# Patient Record
Sex: MALE | Race: BLACK OR AFRICAN AMERICAN | NOT HISPANIC OR LATINO
[De-identification: names, ages, dates, MRNs, and addresses within clinical notes are randomized per-mention and may not be internally consistent; named-entity substitution may affect disease eponyms.]

---

## 2017-02-07 ENCOUNTER — APPOINTMENT (OUTPATIENT)
Dept: OTHER | Facility: CLINIC | Age: 67
End: 2017-02-07

## 2017-02-07 VITALS
DIASTOLIC BLOOD PRESSURE: 86 MMHG | HEIGHT: 66 IN | OXYGEN SATURATION: 100 % | HEART RATE: 70 BPM | WEIGHT: 178 LBS | BODY MASS INDEX: 28.61 KG/M2 | SYSTOLIC BLOOD PRESSURE: 128 MMHG

## 2017-02-07 DIAGNOSIS — Z03.89 ENCOUNTER FOR OBSERVATION FOR OTHER SUSPECTED DISEASES AND CONDITIONS RULED OUT: ICD-10-CM

## 2017-02-07 RX ORDER — IBUPROFEN 600 MG/1
600 TABLET, FILM COATED ORAL
Qty: 20 | Refills: 0 | Status: DISCONTINUED | COMMUNITY
Start: 2016-10-28

## 2017-02-07 RX ORDER — HYDROCODONE BITARTRATE AND ACETAMINOPHEN 5; 300 MG/1; MG/1
5-300 TABLET ORAL
Qty: 20 | Refills: 0 | Status: DISCONTINUED | COMMUNITY
Start: 2016-12-03

## 2017-02-07 RX ORDER — CHLORHEXIDINE GLUCONATE, 0.12% ORAL RINSE 1.2 MG/ML
0.12 SOLUTION DENTAL
Qty: 473 | Refills: 0 | Status: DISCONTINUED | COMMUNITY
Start: 2016-12-03

## 2017-02-07 RX ORDER — METOPROLOL TARTRATE 25 MG/1
25 TABLET, FILM COATED ORAL
Qty: 60 | Refills: 0 | Status: DISCONTINUED | COMMUNITY
Start: 2016-10-13

## 2017-02-07 RX ORDER — SILDENAFIL CITRATE 100 MG/1
100 TABLET, FILM COATED ORAL
Qty: 6 | Refills: 0 | Status: DISCONTINUED | COMMUNITY
Start: 2016-12-06

## 2017-02-07 RX ORDER — AMOXICILLIN 250 MG/1
250 CAPSULE ORAL
Qty: 30 | Refills: 0 | Status: DISCONTINUED | COMMUNITY
Start: 2016-12-03

## 2017-02-07 RX ORDER — AMOXICILLIN AND CLAVULANATE POTASSIUM 875; 125 MG/1; MG/1
875-125 TABLET, COATED ORAL
Qty: 20 | Refills: 0 | Status: DISCONTINUED | COMMUNITY
Start: 2016-10-28

## 2017-02-08 LAB
ALBUMIN SERPL ELPH-MCNC: 4.4 G/DL
ALP BLD-CCNC: 54 U/L
ALT SERPL-CCNC: 28 U/L
ANION GAP SERPL CALC-SCNC: 15 MMOL/L
APPEARANCE: CLEAR
AST SERPL-CCNC: 26 U/L
BASOPHILS # BLD AUTO: 0.03 K/UL
BASOPHILS NFR BLD AUTO: 0.9 %
BILIRUB SERPL-MCNC: 0.4 MG/DL
BILIRUBIN URINE: NEGATIVE
BLOOD URINE: NEGATIVE
BUN SERPL-MCNC: 12 MG/DL
CALCIUM SERPL-MCNC: 9 MG/DL
CHLORIDE SERPL-SCNC: 104 MMOL/L
CHOLEST SERPL-MCNC: 212 MG/DL
CHOLEST/HDLC SERPL: 3.7 RATIO
CO2 SERPL-SCNC: 25 MMOL/L
COLOR: YELLOW
CREAT SERPL-MCNC: 1.05 MG/DL
EOSINOPHIL # BLD AUTO: 0.1 K/UL
EOSINOPHIL NFR BLD AUTO: 3 %
FERRITIN SERPL-MCNC: 110.5 NG/ML
GLUCOSE QUALITATIVE U: NORMAL MG/DL
GLUCOSE SERPL-MCNC: 111 MG/DL
HCT VFR BLD CALC: 36.1 %
HDLC SERPL-MCNC: 57 MG/DL
HGB BLD-MCNC: 12.2 G/DL
IMM GRANULOCYTES NFR BLD AUTO: 0.3 %
IRON SATN MFR SERPL: 22 %
IRON SERPL-MCNC: 75 UG/DL
KETONES URINE: NEGATIVE
LDLC SERPL CALC-MCNC: 133 MG/DL
LEUKOCYTE ESTERASE URINE: NEGATIVE
LYMPHOCYTES # BLD AUTO: 1.03 K/UL
LYMPHOCYTES NFR BLD AUTO: 30.7 %
MAN DIFF?: NORMAL
MCHC RBC-ENTMCNC: 27.4 PG
MCHC RBC-ENTMCNC: 33.8 GM/DL
MCV RBC AUTO: 80.9 FL
MONOCYTES # BLD AUTO: 0.2 K/UL
MONOCYTES NFR BLD AUTO: 6 %
NEUTROPHILS # BLD AUTO: 1.99 K/UL
NEUTROPHILS NFR BLD AUTO: 59.1 %
NITRITE URINE: NEGATIVE
PH URINE: 5.5
PLATELET # BLD AUTO: 152 K/UL
POTASSIUM SERPL-SCNC: 3.8 MMOL/L
PROT SERPL-MCNC: 6.9 G/DL
PROTEIN URINE: 30 MG/DL
RBC # BLD: 4.46 M/UL
RBC # FLD: 13.7 %
SODIUM SERPL-SCNC: 144 MMOL/L
SPECIFIC GRAVITY URINE: 1.02
TIBC SERPL-MCNC: 347 UG/DL
TRIGL SERPL-MCNC: 112 MG/DL
UIBC SERPL-MCNC: 272 UG/DL
UROBILINOGEN URINE: NORMAL MG/DL
WBC # FLD AUTO: 3.36 K/UL

## 2017-03-05 ENCOUNTER — INPATIENT (INPATIENT)
Facility: HOSPITAL | Age: 67
LOS: 1 days | Discharge: HOME | End: 2017-03-07
Attending: INTERNAL MEDICINE | Admitting: INTERNAL MEDICINE
Payer: COMMERCIAL

## 2017-03-05 VITALS
HEART RATE: 106 BPM | TEMPERATURE: 103 F | DIASTOLIC BLOOD PRESSURE: 96 MMHG | OXYGEN SATURATION: 99 % | RESPIRATION RATE: 20 BRPM | SYSTOLIC BLOOD PRESSURE: 159 MMHG

## 2017-03-05 DIAGNOSIS — J18.9 PNEUMONIA, UNSPECIFIED ORGANISM: ICD-10-CM

## 2017-03-05 DIAGNOSIS — Z41.8 ENCOUNTER FOR OTHER PROCEDURES FOR PURPOSES OTHER THAN REMEDYING HEALTH STATE: ICD-10-CM

## 2017-03-05 DIAGNOSIS — J18.1 LOBAR PNEUMONIA, UNSPECIFIED ORGANISM: ICD-10-CM

## 2017-03-05 DIAGNOSIS — N17.9 ACUTE KIDNEY FAILURE, UNSPECIFIED: ICD-10-CM

## 2017-03-05 LAB
ALBUMIN SERPL ELPH-MCNC: 4.2 G/DL — SIGNIFICANT CHANGE UP (ref 3.3–5)
ALP SERPL-CCNC: 55 U/L — SIGNIFICANT CHANGE UP (ref 40–120)
ALT FLD-CCNC: 40 U/L — SIGNIFICANT CHANGE UP (ref 4–41)
APPEARANCE UR: CLEAR — SIGNIFICANT CHANGE UP
AST SERPL-CCNC: 55 U/L — HIGH (ref 4–40)
B PERT DNA SPEC QL NAA+PROBE: SIGNIFICANT CHANGE UP
BASE EXCESS BLDV CALC-SCNC: 3.9 MMOL/L — SIGNIFICANT CHANGE UP
BASOPHILS # BLD AUTO: 0.01 K/UL — SIGNIFICANT CHANGE UP (ref 0–0.2)
BASOPHILS NFR BLD AUTO: 0.1 % — SIGNIFICANT CHANGE UP (ref 0–2)
BILIRUB SERPL-MCNC: 0.4 MG/DL — SIGNIFICANT CHANGE UP (ref 0.2–1.2)
BILIRUB UR-MCNC: NEGATIVE — SIGNIFICANT CHANGE UP
BLOOD GAS VENOUS - CREATININE: 1.46 MG/DL — HIGH (ref 0.5–1.3)
BLOOD UR QL VISUAL: HIGH
BUN SERPL-MCNC: 11 MG/DL — SIGNIFICANT CHANGE UP (ref 7–23)
C PNEUM DNA SPEC QL NAA+PROBE: NOT DETECTED — SIGNIFICANT CHANGE UP
CALCIUM SERPL-MCNC: 8.9 MG/DL — SIGNIFICANT CHANGE UP (ref 8.4–10.5)
CHLORIDE BLDV-SCNC: 99 MMOL/L — SIGNIFICANT CHANGE UP (ref 96–108)
CHLORIDE SERPL-SCNC: 95 MMOL/L — LOW (ref 98–107)
CK SERPL-CCNC: 1031 U/L — HIGH (ref 30–200)
CO2 SERPL-SCNC: 25 MMOL/L — SIGNIFICANT CHANGE UP (ref 22–31)
COLOR SPEC: YELLOW — SIGNIFICANT CHANGE UP
CREAT SERPL-MCNC: 1.5 MG/DL — HIGH (ref 0.5–1.3)
EOSINOPHIL # BLD AUTO: 0 K/UL — SIGNIFICANT CHANGE UP (ref 0–0.5)
EOSINOPHIL NFR BLD AUTO: 0 % — SIGNIFICANT CHANGE UP (ref 0–6)
FLUAV H1 2009 PAND RNA SPEC QL NAA+PROBE: NOT DETECTED — SIGNIFICANT CHANGE UP
FLUAV H1 RNA SPEC QL NAA+PROBE: NOT DETECTED — SIGNIFICANT CHANGE UP
FLUAV H3 RNA SPEC QL NAA+PROBE: NOT DETECTED — SIGNIFICANT CHANGE UP
FLUAV SUBTYP SPEC NAA+PROBE: SIGNIFICANT CHANGE UP
FLUBV RNA SPEC QL NAA+PROBE: NOT DETECTED — SIGNIFICANT CHANGE UP
GAS PNL BLDV: 132 MMOL/L — LOW (ref 136–146)
GLUCOSE BLDV-MCNC: 192 — HIGH (ref 70–99)
GLUCOSE SERPL-MCNC: 198 MG/DL — HIGH (ref 70–99)
GLUCOSE UR-MCNC: NEGATIVE — SIGNIFICANT CHANGE UP
HADV DNA SPEC QL NAA+PROBE: NOT DETECTED — SIGNIFICANT CHANGE UP
HCO3 BLDV-SCNC: 26 MMOL/L — SIGNIFICANT CHANGE UP (ref 20–27)
HCOV 229E RNA SPEC QL NAA+PROBE: NOT DETECTED — SIGNIFICANT CHANGE UP
HCOV HKU1 RNA SPEC QL NAA+PROBE: NOT DETECTED — SIGNIFICANT CHANGE UP
HCOV NL63 RNA SPEC QL NAA+PROBE: NOT DETECTED — SIGNIFICANT CHANGE UP
HCOV OC43 RNA SPEC QL NAA+PROBE: NOT DETECTED — SIGNIFICANT CHANGE UP
HCT VFR BLD CALC: 39.2 % — SIGNIFICANT CHANGE UP (ref 39–50)
HCT VFR BLDV CALC: 43.4 % — SIGNIFICANT CHANGE UP (ref 39–51)
HGB BLD-MCNC: 13.9 G/DL — SIGNIFICANT CHANGE UP (ref 13–17)
HGB BLDV-MCNC: 14.2 G/DL — SIGNIFICANT CHANGE UP (ref 13–17)
HMPV RNA SPEC QL NAA+PROBE: NOT DETECTED — SIGNIFICANT CHANGE UP
HPIV1 RNA SPEC QL NAA+PROBE: NOT DETECTED — SIGNIFICANT CHANGE UP
HPIV2 RNA SPEC QL NAA+PROBE: NOT DETECTED — SIGNIFICANT CHANGE UP
HPIV3 RNA SPEC QL NAA+PROBE: NOT DETECTED — SIGNIFICANT CHANGE UP
HPIV4 RNA SPEC QL NAA+PROBE: NOT DETECTED — SIGNIFICANT CHANGE UP
IMM GRANULOCYTES NFR BLD AUTO: 0.1 % — SIGNIFICANT CHANGE UP (ref 0–1.5)
KETONES UR-MCNC: NEGATIVE — SIGNIFICANT CHANGE UP
LACTATE BLDV-MCNC: 1.5 MMOL/L — SIGNIFICANT CHANGE UP (ref 0.5–2)
LEUKOCYTE ESTERASE UR-ACNC: NEGATIVE — SIGNIFICANT CHANGE UP
LYMPHOCYTES # BLD AUTO: 1.15 K/UL — SIGNIFICANT CHANGE UP (ref 1–3.3)
LYMPHOCYTES # BLD AUTO: 16.3 % — SIGNIFICANT CHANGE UP (ref 13–44)
M PNEUMO DNA SPEC QL NAA+PROBE: NOT DETECTED — SIGNIFICANT CHANGE UP
MCHC RBC-ENTMCNC: 28.1 PG — SIGNIFICANT CHANGE UP (ref 27–34)
MCHC RBC-ENTMCNC: 35.5 % — SIGNIFICANT CHANGE UP (ref 32–36)
MCV RBC AUTO: 79.2 FL — LOW (ref 80–100)
MONOCYTES # BLD AUTO: 0.58 K/UL — SIGNIFICANT CHANGE UP (ref 0–0.9)
MONOCYTES NFR BLD AUTO: 8.2 % — SIGNIFICANT CHANGE UP (ref 2–14)
MUCOUS THREADS # UR AUTO: SIGNIFICANT CHANGE UP
NEUTROPHILS # BLD AUTO: 5.31 K/UL — SIGNIFICANT CHANGE UP (ref 1.8–7.4)
NEUTROPHILS NFR BLD AUTO: 75.3 % — SIGNIFICANT CHANGE UP (ref 43–77)
NITRITE UR-MCNC: NEGATIVE — SIGNIFICANT CHANGE UP
PCO2 BLDV: 45 MMHG — SIGNIFICANT CHANGE UP (ref 41–51)
PH BLDV: 7.41 PH — SIGNIFICANT CHANGE UP (ref 7.32–7.43)
PH UR: 6 — SIGNIFICANT CHANGE UP (ref 4.6–8)
PLATELET # BLD AUTO: 118 K/UL — LOW (ref 150–400)
PMV BLD: 10.8 FL — SIGNIFICANT CHANGE UP (ref 7–13)
PO2 BLDV: 27 MMHG — LOW (ref 35–40)
POTASSIUM BLDV-SCNC: 4 MMOL/L — SIGNIFICANT CHANGE UP (ref 3.4–4.5)
POTASSIUM SERPL-MCNC: 4.3 MMOL/L — SIGNIFICANT CHANGE UP (ref 3.5–5.3)
POTASSIUM SERPL-SCNC: 4.3 MMOL/L — SIGNIFICANT CHANGE UP (ref 3.5–5.3)
PROT SERPL-MCNC: 8.2 G/DL — SIGNIFICANT CHANGE UP (ref 6–8.3)
PROT UR-MCNC: 150 — HIGH
RBC # BLD: 4.95 M/UL — SIGNIFICANT CHANGE UP (ref 4.2–5.8)
RBC # FLD: 13.6 % — SIGNIFICANT CHANGE UP (ref 10.3–14.5)
RBC CASTS # UR COMP ASSIST: SIGNIFICANT CHANGE UP (ref 0–?)
RSV RNA SPEC QL NAA+PROBE: NOT DETECTED — SIGNIFICANT CHANGE UP
RV+EV RNA SPEC QL NAA+PROBE: NOT DETECTED — SIGNIFICANT CHANGE UP
SAO2 % BLDV: 41.2 % — LOW (ref 60–85)
SODIUM SERPL-SCNC: 137 MMOL/L — SIGNIFICANT CHANGE UP (ref 135–145)
SP GR SPEC: 1.02 — SIGNIFICANT CHANGE UP (ref 1–1.03)
UROBILINOGEN FLD QL: NORMAL E.U. — SIGNIFICANT CHANGE UP (ref 0.1–0.2)
WBC # BLD: 7.06 K/UL — SIGNIFICANT CHANGE UP (ref 3.8–10.5)
WBC # FLD AUTO: 7.06 K/UL — SIGNIFICANT CHANGE UP (ref 3.8–10.5)
WBC UR QL: SIGNIFICANT CHANGE UP (ref 0–?)

## 2017-03-05 PROCEDURE — 99223 1ST HOSP IP/OBS HIGH 75: CPT | Mod: GC

## 2017-03-05 PROCEDURE — 71020: CPT | Mod: 26

## 2017-03-05 PROCEDURE — 93010 ELECTROCARDIOGRAM REPORT: CPT

## 2017-03-05 RX ORDER — SODIUM CHLORIDE 9 MG/ML
1000 INJECTION INTRAMUSCULAR; INTRAVENOUS; SUBCUTANEOUS
Qty: 0 | Refills: 0 | Status: DISCONTINUED | OUTPATIENT
Start: 2017-03-05 | End: 2017-03-07

## 2017-03-05 RX ORDER — HEPARIN SODIUM 5000 [USP'U]/ML
5000 INJECTION INTRAVENOUS; SUBCUTANEOUS EVERY 8 HOURS
Qty: 0 | Refills: 0 | Status: DISCONTINUED | OUTPATIENT
Start: 2017-03-05 | End: 2017-03-07

## 2017-03-05 RX ORDER — ACETAMINOPHEN 500 MG
975 TABLET ORAL ONCE
Qty: 0 | Refills: 0 | Status: COMPLETED | OUTPATIENT
Start: 2017-03-05 | End: 2017-03-05

## 2017-03-05 RX ORDER — ACETAMINOPHEN 500 MG
650 TABLET ORAL EVERY 6 HOURS
Qty: 0 | Refills: 0 | Status: DISCONTINUED | OUTPATIENT
Start: 2017-03-05 | End: 2017-03-05

## 2017-03-05 RX ORDER — ACETAMINOPHEN 500 MG
650 TABLET ORAL EVERY 6 HOURS
Qty: 0 | Refills: 0 | Status: DISCONTINUED | OUTPATIENT
Start: 2017-03-05 | End: 2017-03-07

## 2017-03-05 RX ORDER — SODIUM CHLORIDE 9 MG/ML
2000 INJECTION INTRAMUSCULAR; INTRAVENOUS; SUBCUTANEOUS ONCE
Qty: 0 | Refills: 0 | Status: COMPLETED | OUTPATIENT
Start: 2017-03-05 | End: 2017-03-05

## 2017-03-05 RX ORDER — LACTOBACILLUS ACIDOPHILUS 100MM CELL
1 CAPSULE ORAL
Qty: 0 | Refills: 0 | Status: DISCONTINUED | OUTPATIENT
Start: 2017-03-05 | End: 2017-03-07

## 2017-03-05 RX ORDER — AZITHROMYCIN 500 MG/1
500 TABLET, FILM COATED ORAL EVERY 24 HOURS
Qty: 0 | Refills: 0 | Status: DISCONTINUED | OUTPATIENT
Start: 2017-03-06 | End: 2017-03-06

## 2017-03-05 RX ORDER — AZITHROMYCIN 500 MG/1
500 TABLET, FILM COATED ORAL ONCE
Qty: 0 | Refills: 0 | Status: COMPLETED | OUTPATIENT
Start: 2017-03-05 | End: 2017-03-05

## 2017-03-05 RX ORDER — SODIUM CHLORIDE 9 MG/ML
1000 INJECTION INTRAMUSCULAR; INTRAVENOUS; SUBCUTANEOUS ONCE
Qty: 0 | Refills: 0 | Status: COMPLETED | OUTPATIENT
Start: 2017-03-05 | End: 2017-03-05

## 2017-03-05 RX ORDER — CEFTRIAXONE 500 MG/1
1 INJECTION, POWDER, FOR SOLUTION INTRAMUSCULAR; INTRAVENOUS EVERY 24 HOURS
Qty: 0 | Refills: 0 | Status: DISCONTINUED | OUTPATIENT
Start: 2017-03-06 | End: 2017-03-06

## 2017-03-05 RX ADMIN — AZITHROMYCIN 500 MILLIGRAM(S): 500 TABLET, FILM COATED ORAL at 18:51

## 2017-03-05 RX ADMIN — SODIUM CHLORIDE 75 MILLILITER(S): 9 INJECTION INTRAMUSCULAR; INTRAVENOUS; SUBCUTANEOUS at 22:10

## 2017-03-05 RX ADMIN — Medication 975 MILLIGRAM(S): at 18:41

## 2017-03-05 RX ADMIN — HEPARIN SODIUM 5000 UNIT(S): 5000 INJECTION INTRAVENOUS; SUBCUTANEOUS at 22:18

## 2017-03-05 RX ADMIN — Medication 1 TABLET(S): at 18:51

## 2017-03-05 RX ADMIN — SODIUM CHLORIDE 1000 MILLILITER(S): 9 INJECTION INTRAMUSCULAR; INTRAVENOUS; SUBCUTANEOUS at 20:49

## 2017-03-05 RX ADMIN — SODIUM CHLORIDE 2000 MILLILITER(S): 9 INJECTION INTRAMUSCULAR; INTRAVENOUS; SUBCUTANEOUS at 18:41

## 2017-03-05 NOTE — H&P ADULT. - MUSCULOSKELETAL
details… detailed exam decreased ROM due to pain/ROM intact/no joint swelling/no calf tenderness no calf tenderness/no joint swelling/ROM intact/no joint erythema/decreased ROM due to pain/no joint warmth

## 2017-03-05 NOTE — ED PROVIDER NOTE - OBJECTIVE STATEMENT
67 y/o M w/o Hx pw fever.  Pt 5 days of fever associated w/ generalized arthralgias/myalgias, started on tamilfu by PMD 4 days PTA, symptoms persisted, limited PO and loss of appetite so came to ED.  Denies CP, cough, sore throat, AP, n/v, d/c, SOB, known sick contacts, or recent travel.

## 2017-03-05 NOTE — ED PROVIDER NOTE - CARE PLAN
Principal Discharge DX:	Pneumonia of left lower lobe due to infectious organism  Secondary Diagnosis:	Non-traumatic rhabdomyolysis

## 2017-03-05 NOTE — PATIENT PROFILE ADULT. - NS PRO CONTRA REFUSE FLU INFO
Risks/benefits discussed with patient or patient surrogate Vaccine Information Sheet (VIS) provided-VIS date: 8/07/15/Risks/benefits discussed with patient or patient surrogate

## 2017-03-05 NOTE — H&P ADULT. - HISTORY OF PRESENT ILLNESS
65 y/o M with PMH of prostate CA s/p radiation, pw 5 dayds hx of fever, malaise, fatigue.  Sx started March 1st with  fever associated w/ generalized arthralgias/myalgias. He went to see his PCP Dr. Hinson on March 2nd who started him on Tamiflu Pt reports no improvement in symptoms the past few days. He also has limited PO intake and loss of appetite so came to ED.  Denies CP, cough, sore throat, AP, n/v, d/c, SOB, known sick contacts, or recent travel.  ED course: T 102.9, , /96, RR 20, SpO2 99% RA  Initial labs show CBC wnl, Cr 1.5, CK 1031, UA negative for infxn.  Pt was given  3L IV fluid, started on Augmentin, azithromycin admitted to medicine for further management. 67 y/o M with PMH of prostate CA s/p radiation, pw 5 dayds hx of fever, malaise, fatigue.  Sx started March 1st with  fever associated w/ generalized arthralgias/myalgias. He went to see his PCP Dr. Hinson on March 2nd who started him on Tamiflu. Pt reports no improvement in symptoms the past few days. He also has limited PO intake and loss of appetite so came to ED.  Denies CP, cough, sore throat, AP, n/v, d/c, SOB, known sick contacts, or recent travel.  ED course: T 102.9, , /96, RR 20, SpO2 99% RA  Initial labs show CBC wnl, Cr 1.5, CK 1031, UA negative for infxn, pending RVP.  Pt was given  3L IV fluid, started on Augmentin, azithromycin admitted to medicine for further management.

## 2017-03-05 NOTE — ED ADULT NURSE NOTE - OBJECTIVE STATEMENT
Patient presented to ED with c/o chills, malaise, and decreased appetite patient recently dx with the flu on day 4 of Tamiflu. Blood work drawn and sent to lab.  ER provider evaluated patient medication ordered and administered.  Family present at the bedside. Will continue to monitor patient closely.  Casey LOPEZ

## 2017-03-05 NOTE — H&P ADULT. - RS GEN PE MLT RESP DETAILS PC
respirations non-labored/no rhonchi/good air movement/no wheezes/airway patent/no chest wall tenderness/breath sounds equal

## 2017-03-05 NOTE — H&P ADULT. - PROBLEM SELECTOR PLAN 1
-pt with 5 day hx of fever, malaise, fatigue  -possible LLL pna on CXR   -will treat with ceftriaxone, azithromycin  -c/w IVF at 75 cc/hr -pt with 5 day hx of fever, malaise, fatigue  -possible LLL pna on CXR   -will treat with ceftriaxone, azithromycin  -f/u RVP  -c/w IVF at 75 cc/hr

## 2017-03-05 NOTE — H&P ADULT. - PROBLEM SELECTOR PLAN 2
-likely prerenal due to dehydration and mild rhabdomyolysis likely from prolonged immobility  2/p 3L IVF   -will continue IVF at 75 cc/hr  -avoid nephrotoxin  -continue to trend BMP

## 2017-03-05 NOTE — ED PROVIDER NOTE - ATTENDING CONTRIBUTION TO CARE
Pt was seen and evaluated by me. Pt states having body aches and fever starting 4 days. Pt was seen and started on Tamiful with no relief. Pt was taking Tylenol but felt it wasn't helping and notes still having body aches and fevers. Pt denies any headache, neck pain, back pain, cough, SOB, chest pain, or abd pain. Lungs coarse b/l. Tachy. Abd soft, non-tender.

## 2017-03-06 LAB
ALBUMIN SERPL ELPH-MCNC: 3.2 G/DL — LOW (ref 3.3–5)
ALBUMIN SERPL ELPH-MCNC: 3.2 G/DL — LOW (ref 3.3–5)
ALP SERPL-CCNC: 46 U/L — SIGNIFICANT CHANGE UP (ref 40–120)
ALP SERPL-CCNC: 46 U/L — SIGNIFICANT CHANGE UP (ref 40–120)
ALT FLD-CCNC: 39 U/L — SIGNIFICANT CHANGE UP (ref 4–41)
ALT FLD-CCNC: 39 U/L — SIGNIFICANT CHANGE UP (ref 4–41)
APTT BLD: 34.6 SEC — SIGNIFICANT CHANGE UP (ref 27.5–37.4)
AST SERPL-CCNC: 51 U/L — HIGH (ref 4–40)
AST SERPL-CCNC: 51 U/L — HIGH (ref 4–40)
BASOPHILS # BLD AUTO: 0.01 K/UL — SIGNIFICANT CHANGE UP (ref 0–0.2)
BASOPHILS NFR BLD AUTO: 0.2 % — SIGNIFICANT CHANGE UP (ref 0–2)
BILIRUB SERPL-MCNC: 0.4 MG/DL — SIGNIFICANT CHANGE UP (ref 0.2–1.2)
BILIRUB SERPL-MCNC: 0.4 MG/DL — SIGNIFICANT CHANGE UP (ref 0.2–1.2)
BUN SERPL-MCNC: 10 MG/DL — SIGNIFICANT CHANGE UP (ref 7–23)
BUN SERPL-MCNC: 10 MG/DL — SIGNIFICANT CHANGE UP (ref 7–23)
CALCIUM SERPL-MCNC: 7.9 MG/DL — LOW (ref 8.4–10.5)
CALCIUM SERPL-MCNC: 7.9 MG/DL — LOW (ref 8.4–10.5)
CHLORIDE SERPL-SCNC: 101 MMOL/L — SIGNIFICANT CHANGE UP (ref 98–107)
CHLORIDE SERPL-SCNC: 101 MMOL/L — SIGNIFICANT CHANGE UP (ref 98–107)
CHOLEST SERPL-MCNC: 162 MG/DL — SIGNIFICANT CHANGE UP (ref 120–199)
CK SERPL-CCNC: 1016 U/L — HIGH (ref 30–200)
CO2 SERPL-SCNC: 23 MMOL/L — SIGNIFICANT CHANGE UP (ref 22–31)
CO2 SERPL-SCNC: 23 MMOL/L — SIGNIFICANT CHANGE UP (ref 22–31)
CREAT SERPL-MCNC: 1.23 MG/DL — SIGNIFICANT CHANGE UP (ref 0.5–1.3)
CREAT SERPL-MCNC: 1.23 MG/DL — SIGNIFICANT CHANGE UP (ref 0.5–1.3)
EOSINOPHIL # BLD AUTO: 0.02 K/UL — SIGNIFICANT CHANGE UP (ref 0–0.5)
EOSINOPHIL NFR BLD AUTO: 0.4 % — SIGNIFICANT CHANGE UP (ref 0–6)
GLUCOSE SERPL-MCNC: 149 MG/DL — HIGH (ref 70–99)
GLUCOSE SERPL-MCNC: 149 MG/DL — HIGH (ref 70–99)
HAV IGG+IGM SER QL: NONREACTIVE — SIGNIFICANT CHANGE UP
HBA1C BLD-MCNC: 6.2 % — HIGH (ref 4–5.6)
HBV SURFACE AB SER-ACNC: REACTIVE — SIGNIFICANT CHANGE UP
HCT VFR BLD CALC: 33.7 % — LOW (ref 39–50)
HCT VFR BLD CALC: 33.7 % — LOW (ref 39–50)
HCV AB S/CO SERPL IA: 0.11 S/CO — SIGNIFICANT CHANGE UP
HCV AB SERPL-IMP: SIGNIFICANT CHANGE UP
HDLC SERPL-MCNC: 38 MG/DL — SIGNIFICANT CHANGE UP (ref 35–55)
HGB BLD-MCNC: 11.7 G/DL — LOW (ref 13–17)
HGB BLD-MCNC: 11.7 G/DL — LOW (ref 13–17)
IMM GRANULOCYTES NFR BLD AUTO: 0.4 % — SIGNIFICANT CHANGE UP (ref 0–1.5)
INR BLD: 1.28 — HIGH (ref 0.87–1.18)
LDH SERPL L TO P-CCNC: 300 U/L — HIGH (ref 135–225)
LIPID PNL WITH DIRECT LDL SERPL: 103 MG/DL — SIGNIFICANT CHANGE UP
LYMPHOCYTES # BLD AUTO: 1.01 K/UL — SIGNIFICANT CHANGE UP (ref 1–3.3)
LYMPHOCYTES # BLD AUTO: 17.9 % — SIGNIFICANT CHANGE UP (ref 13–44)
MAGNESIUM SERPL-MCNC: 2.1 MG/DL — SIGNIFICANT CHANGE UP (ref 1.6–2.6)
MCHC RBC-ENTMCNC: 27.4 PG — SIGNIFICANT CHANGE UP (ref 27–34)
MCHC RBC-ENTMCNC: 27.4 PG — SIGNIFICANT CHANGE UP (ref 27–34)
MCHC RBC-ENTMCNC: 34.7 % — SIGNIFICANT CHANGE UP (ref 32–36)
MCHC RBC-ENTMCNC: 34.7 % — SIGNIFICANT CHANGE UP (ref 32–36)
MCV RBC AUTO: 78.9 FL — LOW (ref 80–100)
MCV RBC AUTO: 78.9 FL — LOW (ref 80–100)
MONOCYTES # BLD AUTO: 0.42 K/UL — SIGNIFICANT CHANGE UP (ref 0–0.9)
MONOCYTES NFR BLD AUTO: 7.4 % — SIGNIFICANT CHANGE UP (ref 2–14)
NEUTROPHILS # BLD AUTO: 4.17 K/UL — SIGNIFICANT CHANGE UP (ref 1.8–7.4)
NEUTROPHILS NFR BLD AUTO: 73.7 % — SIGNIFICANT CHANGE UP (ref 43–77)
PHOSPHATE SERPL-MCNC: 2.8 MG/DL — SIGNIFICANT CHANGE UP (ref 2.5–4.5)
PLATELET # BLD AUTO: 106 K/UL — LOW (ref 150–400)
PLATELET # BLD AUTO: 106 K/UL — LOW (ref 150–400)
PMV BLD: 11.2 FL — SIGNIFICANT CHANGE UP (ref 7–13)
PMV BLD: 11.2 FL — SIGNIFICANT CHANGE UP (ref 7–13)
POTASSIUM SERPL-MCNC: 4 MMOL/L — SIGNIFICANT CHANGE UP (ref 3.5–5.3)
POTASSIUM SERPL-MCNC: 4 MMOL/L — SIGNIFICANT CHANGE UP (ref 3.5–5.3)
POTASSIUM SERPL-SCNC: 4 MMOL/L — SIGNIFICANT CHANGE UP (ref 3.5–5.3)
POTASSIUM SERPL-SCNC: 4 MMOL/L — SIGNIFICANT CHANGE UP (ref 3.5–5.3)
PROT SERPL-MCNC: 6.6 G/DL — SIGNIFICANT CHANGE UP (ref 6–8.3)
PROT SERPL-MCNC: 6.6 G/DL — SIGNIFICANT CHANGE UP (ref 6–8.3)
PROTHROM AB SERPL-ACNC: 14.6 SEC — HIGH (ref 10–13.1)
RBC # BLD: 4.27 M/UL — SIGNIFICANT CHANGE UP (ref 4.2–5.8)
RBC # BLD: 4.27 M/UL — SIGNIFICANT CHANGE UP (ref 4.2–5.8)
RBC # FLD: 13.9 % — SIGNIFICANT CHANGE UP (ref 10.3–14.5)
RBC # FLD: 13.9 % — SIGNIFICANT CHANGE UP (ref 10.3–14.5)
SODIUM SERPL-SCNC: 138 MMOL/L — SIGNIFICANT CHANGE UP (ref 135–145)
SODIUM SERPL-SCNC: 138 MMOL/L — SIGNIFICANT CHANGE UP (ref 135–145)
SPECIMEN SOURCE: SIGNIFICANT CHANGE UP
SPECIMEN SOURCE: SIGNIFICANT CHANGE UP
T4 FREE SERPL-MCNC: 1.01 NG/DL — SIGNIFICANT CHANGE UP (ref 0.9–1.8)
TRIGL SERPL-MCNC: 105 MG/DL — SIGNIFICANT CHANGE UP (ref 10–149)
TROPONIN T SERPL-MCNC: < 0.06 NG/ML — SIGNIFICANT CHANGE UP (ref 0–0.06)
TSH SERPL-MCNC: 2.45 UIU/ML — SIGNIFICANT CHANGE UP (ref 0.27–4.2)
WBC # BLD: 5.65 K/UL — SIGNIFICANT CHANGE UP (ref 3.8–10.5)
WBC # BLD: 5.65 K/UL — SIGNIFICANT CHANGE UP (ref 3.8–10.5)
WBC # FLD AUTO: 5.65 K/UL — SIGNIFICANT CHANGE UP (ref 3.8–10.5)
WBC # FLD AUTO: 5.65 K/UL — SIGNIFICANT CHANGE UP (ref 3.8–10.5)

## 2017-03-06 PROCEDURE — 71250 CT THORAX DX C-: CPT | Mod: 26

## 2017-03-06 PROCEDURE — 99233 SBSQ HOSP IP/OBS HIGH 50: CPT | Mod: GC

## 2017-03-06 RX ORDER — ONDANSETRON 8 MG/1
4 TABLET, FILM COATED ORAL ONCE
Qty: 0 | Refills: 0 | Status: COMPLETED | OUTPATIENT
Start: 2017-03-06 | End: 2017-03-06

## 2017-03-06 RX ADMIN — SODIUM CHLORIDE 100 MILLILITER(S): 9 INJECTION INTRAMUSCULAR; INTRAVENOUS; SUBCUTANEOUS at 05:01

## 2017-03-06 RX ADMIN — HEPARIN SODIUM 5000 UNIT(S): 5000 INJECTION INTRAVENOUS; SUBCUTANEOUS at 21:33

## 2017-03-06 RX ADMIN — HEPARIN SODIUM 5000 UNIT(S): 5000 INJECTION INTRAVENOUS; SUBCUTANEOUS at 13:45

## 2017-03-06 RX ADMIN — Medication 1 TABLET(S): at 08:20

## 2017-03-06 RX ADMIN — HEPARIN SODIUM 5000 UNIT(S): 5000 INJECTION INTRAVENOUS; SUBCUTANEOUS at 05:01

## 2017-03-06 RX ADMIN — ONDANSETRON 4 MILLIGRAM(S): 8 TABLET, FILM COATED ORAL at 09:22

## 2017-03-06 RX ADMIN — SODIUM CHLORIDE 100 MILLILITER(S): 9 INJECTION INTRAMUSCULAR; INTRAVENOUS; SUBCUTANEOUS at 09:23

## 2017-03-06 RX ADMIN — Medication 1 TABLET(S): at 17:03

## 2017-03-06 RX ADMIN — CEFTRIAXONE 100 GRAM(S): 500 INJECTION, POWDER, FOR SOLUTION INTRAMUSCULAR; INTRAVENOUS at 05:01

## 2017-03-06 RX ADMIN — AZITHROMYCIN 250 MILLIGRAM(S): 500 TABLET, FILM COATED ORAL at 06:27

## 2017-03-07 ENCOUNTER — TRANSCRIPTION ENCOUNTER (OUTPATIENT)
Age: 67
End: 2017-03-07

## 2017-03-07 VITALS
HEART RATE: 70 BPM | RESPIRATION RATE: 19 BRPM | TEMPERATURE: 98 F | DIASTOLIC BLOOD PRESSURE: 75 MMHG | OXYGEN SATURATION: 99 % | SYSTOLIC BLOOD PRESSURE: 131 MMHG

## 2017-03-07 LAB
BACTERIA UR CULT: SIGNIFICANT CHANGE UP
BUN SERPL-MCNC: 11 MG/DL — SIGNIFICANT CHANGE UP (ref 7–23)
CALCIUM SERPL-MCNC: 8 MG/DL — LOW (ref 8.4–10.5)
CHLORIDE SERPL-SCNC: 99 MMOL/L — SIGNIFICANT CHANGE UP (ref 98–107)
CK SERPL-CCNC: 810 U/L — HIGH (ref 30–200)
CO2 SERPL-SCNC: 27 MMOL/L — SIGNIFICANT CHANGE UP (ref 22–31)
CREAT SERPL-MCNC: 1.11 MG/DL — SIGNIFICANT CHANGE UP (ref 0.5–1.3)
FERRITIN SERPL-MCNC: 1060 NG/ML — HIGH (ref 30–400)
GLUCOSE SERPL-MCNC: 151 MG/DL — HIGH (ref 70–99)
HCT VFR BLD CALC: 32.5 % — LOW (ref 39–50)
HGB BLD-MCNC: 11.3 G/DL — LOW (ref 13–17)
IRON SATN MFR SERPL: 193 UG/DL — SIGNIFICANT CHANGE UP (ref 155–535)
IRON SATN MFR SERPL: 36 UG/DL — LOW (ref 45–165)
L PNEUMO AG UR QL: NEGATIVE — SIGNIFICANT CHANGE UP
MAGNESIUM SERPL-MCNC: 2.3 MG/DL — SIGNIFICANT CHANGE UP (ref 1.6–2.6)
MCHC RBC-ENTMCNC: 27.4 PG — SIGNIFICANT CHANGE UP (ref 27–34)
MCHC RBC-ENTMCNC: 34.8 % — SIGNIFICANT CHANGE UP (ref 32–36)
MCV RBC AUTO: 78.7 FL — LOW (ref 80–100)
MYOGLOBIN UR-MCNC: 358 MCG/L — HIGH
PHOSPHATE SERPL-MCNC: 2.9 MG/DL — SIGNIFICANT CHANGE UP (ref 2.5–4.5)
PLATELET # BLD AUTO: 118 K/UL — LOW (ref 150–400)
PMV BLD: 11.3 FL — SIGNIFICANT CHANGE UP (ref 7–13)
POTASSIUM SERPL-MCNC: 4.1 MMOL/L — SIGNIFICANT CHANGE UP (ref 3.5–5.3)
POTASSIUM SERPL-SCNC: 4.1 MMOL/L — SIGNIFICANT CHANGE UP (ref 3.5–5.3)
RBC # BLD: 4.13 M/UL — LOW (ref 4.2–5.8)
RBC # FLD: 13.8 % — SIGNIFICANT CHANGE UP (ref 10.3–14.5)
SODIUM SERPL-SCNC: 140 MMOL/L — SIGNIFICANT CHANGE UP (ref 135–145)
SPECIMEN SOURCE: SIGNIFICANT CHANGE UP
TRANSFERRIN SERPL-MCNC: 170 MG/DL — LOW (ref 200–360)
UIBC SERPL-MCNC: 157 UG/DL — SIGNIFICANT CHANGE UP (ref 110–370)
WBC # BLD: 4.27 K/UL — SIGNIFICANT CHANGE UP (ref 3.8–10.5)
WBC # FLD AUTO: 4.27 K/UL — SIGNIFICANT CHANGE UP (ref 3.8–10.5)

## 2017-03-07 PROCEDURE — 99239 HOSP IP/OBS DSCHRG MGMT >30: CPT

## 2017-03-07 RX ORDER — LACTOBACILLUS ACIDOPHILUS 100MM CELL
1 CAPSULE ORAL
Qty: 0 | Refills: 0 | COMMUNITY
Start: 2017-03-07

## 2017-03-07 RX ADMIN — HEPARIN SODIUM 5000 UNIT(S): 5000 INJECTION INTRAVENOUS; SUBCUTANEOUS at 13:53

## 2017-03-07 RX ADMIN — Medication 1 TABLET(S): at 16:43

## 2017-03-07 RX ADMIN — HEPARIN SODIUM 5000 UNIT(S): 5000 INJECTION INTRAVENOUS; SUBCUTANEOUS at 05:59

## 2017-03-07 RX ADMIN — Medication 1 TABLET(S): at 07:54

## 2017-03-07 NOTE — DISCHARGE NOTE ADULT - MEDICATION SUMMARY - MEDICATIONS TO STOP TAKING
I will STOP taking the medications listed below when I get home from the hospital:    Tamiflu 75 mg oral capsule  -- 1 cap(s) by mouth 2 times a day

## 2017-03-07 NOTE — DISCHARGE NOTE ADULT - HOSPITAL COURSE
HPI  66M with PMH of prostate CA s/p radiation, pw 5 dayds hx of fever, malaise, fatigue.  Sx started March 1st with  fever associated w/ generalized arthralgias/myalgias. He went to see his PCP Dr. Hinson on March 2nd who started him on Tamiflu. Pt reports no improvement in symptoms the past few days. He also has limited PO intake and loss of appetite so came to ED.  Denies CP, cough, sore throat, AP, n/v, d/c, SOB, known sick contacts, or recent travel.  ED course: T 102.9, , /96, RR 20, SpO2 99% RA  Initial labs show CBC wnl, Cr 1.5, CK 1031, UA negative for infxn, RVP negative.  Pt was given  3L IV fluid, started on Ceftriaxone, azithromycin admitted to medicine for further management.    Hospital Course: Patient had CT Chest that verified LLL PNA. Patient was noted to have crackles on L exam, having no SOB and all vitals remained stable for 2 days while in the hospital. Labwork demonstrated some mild rhabdomyolysis, which improved with fluid. No overt etiology could be determined, likely viral etiology that may have incited myalgias, fatigue, and URI. Patient was transitioned from Ceftriaxone/Azithromycin to Levoquin PO after his first day in the hospital. Patiend was discharged on Levoquin. HPI  66M with PMH of prostate CA s/p radiation, pw 5 dayds hx of fever, malaise, fatigue.  Sx started March 1st with  fever associated w/ generalized arthralgias/myalgias. He went to see his PCP Dr. Hinson on March 2nd who started him on Tamiflu. Pt reports no improvement in symptoms the past few days. He also has limited PO intake and loss of appetite so came to ED.  Denies CP, cough, sore throat, AP, n/v, d/c, SOB, known sick contacts, or recent travel.  ED course: T 102.9, , /96, RR 20, SpO2 99% RA  Initial labs show CBC wnl, Cr 1.5, CK 1031, UA negative for infxn, RVP negative.  Pt was given  3L IV fluid, started on Ceftriaxone, azithromycin admitted to medicine for further management.    Hospital Course: Patient had CT Chest that verified LLL PNA. Patient was noted to have crackles on L exam, having no SOB and all vitals remained stable for 2 days while in the hospital. Labwork demonstrated some mild rhabdomyolysis, which improved with fluid. No overt etiology could be determined, likely viral etiology that may have incited myalgias, fatigue, and URI. Patient was transitioned from Ceftriaxone/Azithromycin to Levoquin PO after his first day in the hospital. Patient was discharged on Levoquin. HPI  66M with PMH of prostate CA s/p radiation, pw 5 dayds hx of fever, malaise, fatigue.  Sx started March 1st with  fever associated w/ generalized arthralgias/myalgias. He went to see his PCP Dr. Hinson on March 2nd who started him on Tamiflu. Pt reports no improvement in symptoms the past few days. He also has limited PO intake and loss of appetite so came to ED.  Denies CP, cough, sore throat, AP, n/v, d/c, SOB, known sick contacts, or recent travel.  ED course: T 102.9, , /96, RR 20, SpO2 99% RA  Initial labs show CBC wnl, Cr 1.5, CK 1031, UA negative for infxn, RVP negative.  Pt was given  3L IV fluid, started on Ceftriaxone, azithromycin admitted to medicine for further management.    Hospital Course: Patient had CT Chest that verified LLL PNA. Patient was noted to have crackles on L exam, having no SOB and all vitals remained stable for 2 days while in the hospital. Labwork demonstrated some mild rhabdomyolysis, which improved with fluid. No overt etiology could be determined, likely viral etiology that may have incited myalgias, fatigue, and URI. Patient was transitioned from Ceftriaxone/Azithromycin to Levaquin PO after his first day in the hospital. His CHRISTAL resolved and CK downtrended. Patient was discharged to complete a course of Levaquin for pneumonia.     Pt will follow up with his PMD.

## 2017-03-07 NOTE — DISCHARGE NOTE ADULT - CARE PROVIDER_API CALL
Jeevan Hinson  [address] [place] [id number]  Phone: [phone]  Fax: (       -

## 2017-03-07 NOTE — DISCHARGE NOTE ADULT - CARE PLAN
Principal Discharge DX:	Pneumonia of left lower lobe due to infectious organism  Goal:	Please contin  Secondary Diagnosis:	Non-traumatic rhabdomyolysis Principal Discharge DX:	Pneumonia of left lower lobe due to infectious organism  Goal:	Please continue treatment with Levofloxacin. Take levofloxacin until 3/11/17, 1 tablet daily.  Instructions for follow-up, activity and diet:	Please follow-up with your primary medical doctor. Continue to take levofloxacin daily until 3/11.  Please return to the ED if you experience worsening shortness of breath or fevers.  Secondary Diagnosis:	Non-traumatic rhabdomyolysis  Goal:	Please let your doctor know that you had an elevated CK (about 1000) on admission with blood in the urine but no RBCs, and acute kidney injury (Cr 1.5 on admission) suggestive of rhabdomyelysis.  Instructions for follow-up, activity and diet:	This is likely a result of some muscle cell breakdown. This resolved over the course of your admission and your kidney function improved to Cr 1.11 on day of discharge. The cause for this is unknown, likely viral, and related to the muscle soreness you were having. Please follow with your doctor and call or return to the emergency room if you have worsening muscle pain.

## 2017-03-07 NOTE — DISCHARGE NOTE ADULT - PLAN OF CARE
Please contin Please follow-up with your primary medical doctor. Continue to take levofloxacin daily until 3/11.  Please return to the ED if you experience worsening shortness of breath or fevers. Please let your doctor know that you had an elevated CK (about 1000) on admission with blood in the urine but no RBCs, and acute kidney injury (Cr 1.5 on admission) suggestive of rhabdomyelysis. This is likely a result of some muscle cell breakdown. This resolved over the course of your admission and your kidney function improved to Cr 1.11 on day of discharge. The cause for this is unknown, likely viral, and related to the muscle soreness you were having. Please follow with your doctor and call or return to the emergency room if you have worsening muscle pain. Please continue treatment with Levofloxacin. Take levofloxacin until 3/11/17, 1 tablet daily.

## 2017-03-07 NOTE — DISCHARGE NOTE ADULT - MEDICATION SUMMARY - MEDICATIONS TO TAKE
I will START or STAY ON the medications listed below when I get home from the hospital:    lactobacillus acidophilus oral capsule  -- 1 tab(s) by mouth once a day  -- Indication: For Health maintenance    Levaquin 750 mg oral tablet  -- 1 tab(s) by mouth every 24 hours MDD:1 Tab  -- Indication: For Pneumonia

## 2017-03-07 NOTE — DISCHARGE NOTE ADULT - PROVIDER TOKENS
FREE:[LAST:[Joya],FIRST:[Jeevan],PHONE:[phone]],FAX:[(   [phone]    -],ADDRESS:[[phone] [place] [address]]]

## 2017-03-10 LAB
BACTERIA BLD CULT: SIGNIFICANT CHANGE UP
BACTERIA BLD CULT: SIGNIFICANT CHANGE UP

## 2017-03-21 ENCOUNTER — FORM ENCOUNTER (OUTPATIENT)
Age: 67
End: 2017-03-21

## 2017-04-06 ENCOUNTER — APPOINTMENT (OUTPATIENT)
Dept: OTHER | Facility: CLINIC | Age: 67
End: 2017-04-06

## 2017-04-06 VITALS
OXYGEN SATURATION: 97 % | WEIGHT: 175 LBS | HEART RATE: 72 BPM | DIASTOLIC BLOOD PRESSURE: 93 MMHG | SYSTOLIC BLOOD PRESSURE: 148 MMHG | BODY MASS INDEX: 28.12 KG/M2 | HEIGHT: 66 IN

## 2017-06-22 ENCOUNTER — RX RENEWAL (OUTPATIENT)
Age: 67
End: 2017-06-22

## 2017-10-23 ENCOUNTER — APPOINTMENT (OUTPATIENT)
Dept: OTHER | Facility: CLINIC | Age: 67
End: 2017-10-23
Payer: COMMERCIAL

## 2017-10-23 VITALS
OXYGEN SATURATION: 98 % | WEIGHT: 172 LBS | HEART RATE: 76 BPM | BODY MASS INDEX: 27.64 KG/M2 | SYSTOLIC BLOOD PRESSURE: 146 MMHG | HEIGHT: 66 IN | DIASTOLIC BLOOD PRESSURE: 92 MMHG

## 2017-10-23 PROCEDURE — 99214 OFFICE O/P EST MOD 30 MIN: CPT

## 2018-02-05 ENCOUNTER — APPOINTMENT (OUTPATIENT)
Dept: OTHER | Facility: CLINIC | Age: 68
End: 2018-02-05
Payer: COMMERCIAL

## 2018-02-05 ENCOUNTER — LABORATORY RESULT (OUTPATIENT)
Age: 68
End: 2018-02-05

## 2018-02-05 VITALS
HEIGHT: 67 IN | DIASTOLIC BLOOD PRESSURE: 84 MMHG | OXYGEN SATURATION: 98 % | SYSTOLIC BLOOD PRESSURE: 126 MMHG | BODY MASS INDEX: 27 KG/M2 | WEIGHT: 172 LBS | HEART RATE: 73 BPM | RESPIRATION RATE: 14 BRPM

## 2018-02-05 PROCEDURE — 96150: CPT

## 2018-02-05 PROCEDURE — 94010 BREATHING CAPACITY TEST: CPT

## 2018-02-05 PROCEDURE — 99397 PER PM REEVAL EST PAT 65+ YR: CPT

## 2018-02-05 PROCEDURE — 99214 OFFICE O/P EST MOD 30 MIN: CPT | Mod: 25

## 2018-02-05 RX ORDER — SILDENAFIL CITRATE 25 MG/1
25 TABLET, FILM COATED ORAL
Qty: 6 | Refills: 0 | Status: COMPLETED | COMMUNITY
Start: 2017-04-06

## 2018-02-06 LAB
APPEARANCE: CLEAR
BASOPHILS # BLD AUTO: 0.02 K/UL
BASOPHILS NFR BLD AUTO: 0.5 %
BILIRUBIN URINE: NEGATIVE
BLOOD URINE: NEGATIVE
CHOLEST SERPL-MCNC: 224 MG/DL
CHOLEST/HDLC SERPL: 3.7 RATIO
COLOR: YELLOW
EOSINOPHIL # BLD AUTO: 0.1 K/UL
EOSINOPHIL NFR BLD AUTO: 2.6 %
GLUCOSE QUALITATIVE U: NEGATIVE MG/DL
HCT VFR BLD CALC: 38.9 %
HDLC SERPL-MCNC: 60 MG/DL
HGB BLD-MCNC: 12.8 G/DL
IMM GRANULOCYTES NFR BLD AUTO: 0.3 %
KETONES URINE: NEGATIVE
LDLC SERPL CALC-MCNC: 126 MG/DL
LEUKOCYTE ESTERASE URINE: NEGATIVE
LYMPHOCYTES # BLD AUTO: 1.42 K/UL
LYMPHOCYTES NFR BLD AUTO: 37.2 %
MAN DIFF?: NORMAL
MCHC RBC-ENTMCNC: 27.4 PG
MCHC RBC-ENTMCNC: 32.9 GM/DL
MCV RBC AUTO: 83.1 FL
MONOCYTES # BLD AUTO: 0.28 K/UL
MONOCYTES NFR BLD AUTO: 7.3 %
NEUTROPHILS # BLD AUTO: 1.99 K/UL
NEUTROPHILS NFR BLD AUTO: 52.1 %
NITRITE URINE: NEGATIVE
PH URINE: 5
PLATELET # BLD AUTO: 162 K/UL
PROTEIN URINE: NEGATIVE MG/DL
RBC # BLD: 4.68 M/UL
RBC # FLD: 13.9 %
SPECIFIC GRAVITY URINE: 1.02
TRIGL SERPL-MCNC: 188 MG/DL
UROBILINOGEN URINE: NEGATIVE MG/DL
WBC # FLD AUTO: 3.82 K/UL

## 2018-02-10 NOTE — DISCHARGE NOTE ADULT - PATIENT PORTAL LINK FT
“You can access the FollowHealth Patient Portal, offered by [facility], by registering with the following website: [email or web address]” Admitted

## 2018-03-15 ENCOUNTER — APPOINTMENT (OUTPATIENT)
Dept: GASTROENTEROLOGY | Facility: CLINIC | Age: 68
End: 2018-03-15

## 2018-11-27 ENCOUNTER — APPOINTMENT (OUTPATIENT)
Dept: GASTROENTEROLOGY | Facility: CLINIC | Age: 68
End: 2018-11-27
Payer: MEDICARE

## 2018-11-27 VITALS
TEMPERATURE: 98.1 F | SYSTOLIC BLOOD PRESSURE: 174 MMHG | DIASTOLIC BLOOD PRESSURE: 90 MMHG | WEIGHT: 170 LBS | HEIGHT: 67 IN | OXYGEN SATURATION: 97 % | BODY MASS INDEX: 26.68 KG/M2 | HEART RATE: 77 BPM | RESPIRATION RATE: 16 BRPM

## 2018-11-27 DIAGNOSIS — K21.9 GASTRO-ESOPHAGEAL REFLUX DISEASE W/OUT ESOPHAGITIS: ICD-10-CM

## 2018-11-27 DIAGNOSIS — R10.9 UNSPECIFIED ABDOMINAL PAIN: ICD-10-CM

## 2018-11-27 PROCEDURE — 99204 OFFICE O/P NEW MOD 45 MIN: CPT

## 2018-11-28 LAB
ALBUMIN SERPL ELPH-MCNC: 4.4 G/DL
ALP BLD-CCNC: 58 U/L
ALT SERPL-CCNC: 33 U/L
ANION GAP SERPL CALC-SCNC: 12 MMOL/L
AST SERPL-CCNC: 27 U/L
BASOPHILS # BLD AUTO: 0.02 K/UL
BASOPHILS NFR BLD AUTO: 0.6 %
BILIRUB SERPL-MCNC: 0.5 MG/DL
BUN SERPL-MCNC: 12 MG/DL
CALCIUM SERPL-MCNC: 9.3 MG/DL
CHLORIDE SERPL-SCNC: 103 MMOL/L
CO2 SERPL-SCNC: 27 MMOL/L
CREAT SERPL-MCNC: 1.14 MG/DL
EOSINOPHIL # BLD AUTO: 0.15 K/UL
EOSINOPHIL NFR BLD AUTO: 4.5 %
GLUCOSE SERPL-MCNC: 106 MG/DL
HCT VFR BLD CALC: 38.1 %
HGB BLD-MCNC: 13.2 G/DL
IMM GRANULOCYTES NFR BLD AUTO: 0.3 %
LYMPHOCYTES # BLD AUTO: 1.54 K/UL
LYMPHOCYTES NFR BLD AUTO: 45.7 %
MAN DIFF?: NORMAL
MCHC RBC-ENTMCNC: 28.1 PG
MCHC RBC-ENTMCNC: 34.6 GM/DL
MCV RBC AUTO: 81.2 FL
MONOCYTES # BLD AUTO: 0.35 K/UL
MONOCYTES NFR BLD AUTO: 10.4 %
NEUTROPHILS # BLD AUTO: 1.3 K/UL
NEUTROPHILS NFR BLD AUTO: 38.5 %
PLATELET # BLD AUTO: 169 K/UL
POTASSIUM SERPL-SCNC: 4.1 MMOL/L
PROT SERPL-MCNC: 7.4 G/DL
RBC # BLD: 4.69 M/UL
RBC # FLD: 13.6 %
SODIUM SERPL-SCNC: 142 MMOL/L
WBC # FLD AUTO: 3.37 K/UL

## 2019-01-09 ENCOUNTER — RESULT REVIEW (OUTPATIENT)
Age: 69
End: 2019-01-09

## 2019-01-09 ENCOUNTER — OUTPATIENT (OUTPATIENT)
Dept: OUTPATIENT SERVICES | Facility: HOSPITAL | Age: 69
LOS: 1 days | End: 2019-01-09
Payer: COMMERCIAL

## 2019-01-09 DIAGNOSIS — K21.9 GASTRO-ESOPHAGEAL REFLUX DISEASE WITHOUT ESOPHAGITIS: ICD-10-CM

## 2019-01-09 PROCEDURE — 88305 TISSUE EXAM BY PATHOLOGIST: CPT

## 2019-01-09 PROCEDURE — 43239 EGD BIOPSY SINGLE/MULTIPLE: CPT

## 2019-01-09 PROCEDURE — 88305 TISSUE EXAM BY PATHOLOGIST: CPT | Mod: 26

## 2019-01-09 PROCEDURE — 88312 SPECIAL STAINS GROUP 1: CPT

## 2019-01-09 PROCEDURE — 88312 SPECIAL STAINS GROUP 1: CPT | Mod: 26

## 2019-01-14 ENCOUNTER — FORM ENCOUNTER (OUTPATIENT)
Age: 69
End: 2019-01-14

## 2019-01-15 ENCOUNTER — OUTPATIENT (OUTPATIENT)
Dept: OUTPATIENT SERVICES | Facility: HOSPITAL | Age: 69
LOS: 1 days | End: 2019-01-15
Payer: COMMERCIAL

## 2019-01-15 ENCOUNTER — APPOINTMENT (OUTPATIENT)
Dept: CT IMAGING | Facility: IMAGING CENTER | Age: 69
End: 2019-01-15
Payer: COMMERCIAL

## 2019-01-15 DIAGNOSIS — R10.9 UNSPECIFIED ABDOMINAL PAIN: ICD-10-CM

## 2019-01-15 PROCEDURE — 82565 ASSAY OF CREATININE: CPT

## 2019-01-15 PROCEDURE — 74178 CT ABD&PLV WO CNTR FLWD CNTR: CPT | Mod: 26

## 2019-01-15 PROCEDURE — 74178 CT ABD&PLV WO CNTR FLWD CNTR: CPT

## 2019-02-07 ENCOUNTER — APPOINTMENT (OUTPATIENT)
Dept: GASTROENTEROLOGY | Facility: CLINIC | Age: 69
End: 2019-02-07

## 2019-03-20 ENCOUNTER — APPOINTMENT (OUTPATIENT)
Age: 69
End: 2019-03-20
Payer: COMMERCIAL

## 2019-03-20 ENCOUNTER — APPOINTMENT (OUTPATIENT)
Dept: OTHER | Facility: CLINIC | Age: 69
End: 2019-03-20
Payer: COMMERCIAL

## 2019-03-20 VITALS
RESPIRATION RATE: 13 BRPM | DIASTOLIC BLOOD PRESSURE: 100 MMHG | HEIGHT: 67 IN | SYSTOLIC BLOOD PRESSURE: 182 MMHG | HEART RATE: 76 BPM | OXYGEN SATURATION: 100 % | BODY MASS INDEX: 26.94 KG/M2

## 2019-03-20 VITALS — RESPIRATION RATE: 13 BRPM | HEIGHT: 67 IN | BODY MASS INDEX: 27 KG/M2 | WEIGHT: 172 LBS

## 2019-03-20 DIAGNOSIS — A04.8 OTHER SPECIFIED BACTERIAL INTESTINAL INFECTIONS: ICD-10-CM

## 2019-03-20 PROCEDURE — 96150: CPT

## 2019-03-20 PROCEDURE — 99396 PREV VISIT EST AGE 40-64: CPT | Mod: 25

## 2019-03-20 PROCEDURE — 99214 OFFICE O/P EST MOD 30 MIN: CPT | Mod: 25

## 2019-03-20 NOTE — HISTORY OF PRESENT ILLNESS
[FreeTextEntry1] : Patient is a  retired from [employer] 6/10/ 2006 Patient was diagnosed with Prostate Adenocarcinoma T1cN0, Lupis score \par He subsequently underwent IM Radiation therapy 04 16 2007-06 19 2007 and seed implant \par c/o inability to sustain erections but  stated that Viagra 100 mg for ED is effective \par he is c/o intermittent heartburn 3 times a week and L mid  abd pain daily \par  mostly before BM, \par He has no constipation , no blood in stool\par  his CT abd and pelvis 01 30 2019 esd NL \par dysphagia for solids on and off \par he stated he saw Dr Osborne for Urology follow up last month and had a PSA\par EGD 01 019- HP pos gastritis , was prescribed course of ABX but taking it PRN \par he stated that he had colonoscopy 4-5 years ago \par he will bring me report \par \par patient was born in [place] [employer] native \par Has 2 sons\par \par wife had Breast cancer in 2012 but doing well \par

## 2019-03-20 NOTE — DISCUSSION/SUMMARY
[Patient seen for WTC Monitoring ___] : Patient was seen for WTC monitoring [unfilled] [Please See Note in Chart and Documentation in Trial DB] : Please see note in chart and documentation in Trial DB. 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 \par Dates: 09 11 2001 3 days , 12 hours on the first day and 8 hours for 2 days \par Location: adjacent to [facility]/pile, arrived after WTC collapse,\par Activity: generator maintenance \par Occ Hx: working at [employer] \par \par HPI: \par Upper resp: occasional nasal congestion \par Lower reps:no cough, no wheezing, no SOB \par Cardiac:no chest pain, no palpitations, no heart murmur, no MCKOY, no leg swelling, occasional skipped beat \par GI: occasional heartburn few times a week, for few years acid reflex, abdominal pain for 4-5  years in epig area, come and goes, worse before BM, and its better after BM , had abd SONO and CT , no nausea,no vomiting, no unexplained weight loss, no blood in stool, no diarrhea, no constipation \par Sleep apnea: no \par \par Skin: no lesions /no rash \par \par \par PCP: none\par Urologist Dr Jose Damon\par \par \par PMH/PSH: Prostate CA   Prostate Seeds and RT \par Colonoscopy early 2015 \par 	 \par \par \par \par Review of Systems—IAMQ reviewed with patient\par \par PE: see in Trial DB \par \par Spirometry: not done today \par  high BP \par \par A/P: periodic WTC monitoring visit \par  Follow up with PCP for health  high BP , \par CXR, CBC, CMP, lipids, UA ordered \par \par

## 2019-03-20 NOTE — REVIEW OF SYSTEMS
Cheek-To-Nose Interpolation Flap Text: A decision was made to reconstruct the defect utilizing an interpolation axial flap and a staged reconstruction.  A telfa template was made of the defect.  This telfa template was then used to outline the Cheek-To-Nose Interpolation flap.  The donor area for the pedicle flap was then injected with anesthesia.  The flap was excised through the skin and subcutaneous tissue down to the layer of the underlying musculature.  The interpolation flap was carefully excised within this deep plane to maintain its blood supply.  The edges of the donor site were undermined.   The donor site was closed in a primary fashion.  The pedicle was then rotated into position and sutured.  Once the tube was sutured into place, adequate blood supply was confirmed with blanching and refill.  The pedicle was then wrapped with xeroform gauze and dressed appropriately with a telfa and gauze bandage to ensure continued blood supply and protect the attached pedicle. [see HPI] : see HPI

## 2019-03-20 NOTE — REASON FOR VISIT
[Follow-Up] : a follow-up visit [FreeTextEntry1] : CAP certified by [facility] as WTC related, ED, abd pain

## 2019-03-20 NOTE — DISCUSSION/SUMMARY
[Patient seen for WTC Monitoring ___] : Patient was seen for WTC monitoring [unfilled] [Please See Note in Chart and Documentation in Trial DB] : Please see note in chart and documentation in Trial DB. 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 \par Dates: 09 11 2001 3 days , 12 hours on the first day and 8 hours for 2 days \par Location: adjacent to [facility]/pile, arrived after WTC collapse,\par Activity: generator maintenance \par Occ Hx: working at [employer] \par \par HPI: \par Upper resp: occasional nasal congestion \par Lower reps:no cough, no wheezing, no SOB \par Cardiac:no chest pain, no palpitations, no heart murmur, no MCKOY, no leg swelling, occasional skipped beat \par GI: occasional heartburn few times a week, for few years acid reflex, abdominal pain for 4-5  years in epig area, come and goes, worse before BM, and its better after BM , had abd SONO and CT , no nausea,no vomiting, no unexplained weight loss, no blood in stool, no diarrhea, no constipation \par Sleep apnea: no \par \par Skin: no lesions /no rash \par \par \par PCP: none\par Urologist Dr Jose Damon\par \par \par PMH/PSH: Prostate CA   Prostate Seeds and RT \par Colonoscopy early 2015 \par 	 \par \par \par \par Review of Systems—IAMQ reviewed with patient\par \par PE: see in Trial DB \par \par Spirometry: not done today \par  high BP \par \par A/P: periodic WTC monitoring visit \par  Follow up with PCP for health  high BP , \par CXR, CBC, CMP, lipids, UA ordered \par \par

## 2019-03-20 NOTE — ASSESSMENT
[FreeTextEntry1] : I had  prescribed treatment for HP gastritis \par advised to fill through his insurance / this conditions is not covered under  WTC program/ \par advised to take for 2 weeks as a course as  prescribed not PRN \par   he will forward me his latest colonoscopy report \par  hx of CAP- condition is certified  BY [facility] as  WTC related \par  i will request record of his last visit  from pts urologist  Dr Osborne\par  ED - cont with Viagra PRN \par

## 2019-03-20 NOTE — ASSESSMENT
[FreeTextEntry1] : I had  prescribed treatment for HP gastritis \par advised to fill through his insurance / this conditions is not covered under  WTC program/ \par advised to take for 2 weeks as a course as  prescribed not PRN \par   he will forward me his latest colonoscopy report \par  hx of CAP- condition is certified  BY [facility] as  WTC related \par  i will request record of his last visit  from pts urologist  Dr Osborne\par  ED - cont with Viagra PRN \par

## 2019-03-20 NOTE — HISTORY OF PRESENT ILLNESS
[FreeTextEntry1] : Patient is a  retired from [employer] 6/10/ 2006 Patient was diagnosed with Prostate Adenocarcinoma T1cN0, Lupis score \par He subsequently underwent IM Radiation therapy 04 16 2007-06 19 2007 and seed implant \par c/o inability to sustain erections but  stated that Viagra 100 mg for ED is effective \par he is c/o intermittent heartburn 3 times a week and L mid  abd pain daily \par  mostly before BM, \par He has no constipation , no blood in stool\par  his CT abd and pelvis 01 30 2019 esd NL \par dysphagia for solids on and off \par he stated he saw Dr Osborne for Urology follow up last month and had a PSA\par EGD 01 019- HP pos gastritis , was prescribed course of ABX but taking it PRN \par he stated that he had colonoscopy 4-5 years ago \par he will bring me report \par \par patient was born in [facility] native \par Has 2 sons\par \par wife had Breast cancer in 2012 but doing well \par

## 2019-03-20 NOTE — REASON FOR VISIT
[Follow-Up] : a follow-up visit [FreeTextEntry1] : CAP certified by [facility] as WTC related, ED, abd pain

## 2019-03-20 NOTE — PHYSICAL EXAM
[General Appearance - Alert] : alert [Sclera] : the sclera and conjunctiva were normal [General Appearance - In No Acute Distress] : in no acute distress [Extraocular Movements] : extraocular movements were intact [PERRL With Normal Accommodation] : pupils were equal in size, round, and reactive to light [Outer Ear] : the ears and nose were normal in appearance [Oropharynx] : the oropharynx was normal [Heart Rate And Rhythm] : heart rate was normal and rhythm regular [Auscultation Breath Sounds / Voice Sounds] : lungs were clear to auscultation bilaterally [Heart Sounds] : normal S1 and S2 [Heart Sounds Gallop] : no gallops [Murmurs] : no murmurs [Heart Sounds Pericardial Friction Rub] : no pericardial rub [Breast Appearance] : normal in appearance [Breast Palpation Mass] : no palpable masses [Abdomen Soft] : soft [Bowel Sounds] : normal bowel sounds [Abdomen Tenderness] : non-tender [] : no hepato-splenomegaly [Abdomen Mass (___ Cm)] : no abdominal mass palpated [FreeTextEntry1] : t

## 2019-03-20 NOTE — HEALTH RISK ASSESSMENT
[Patient reported colonoscopy was normal] : Patient reported colonoscopy was normal [ColonoscopyDate] : 01/01/2015

## 2019-03-20 NOTE — PHYSICAL EXAM
[General Appearance - Alert] : alert [General Appearance - In No Acute Distress] : in no acute distress [Sclera] : the sclera and conjunctiva were normal [Extraocular Movements] : extraocular movements were intact [PERRL With Normal Accommodation] : pupils were equal in size, round, and reactive to light [Outer Ear] : the ears and nose were normal in appearance [Oropharynx] : the oropharynx was normal [Heart Rate And Rhythm] : heart rate was normal and rhythm regular [Auscultation Breath Sounds / Voice Sounds] : lungs were clear to auscultation bilaterally [Heart Sounds Gallop] : no gallops [Heart Sounds] : normal S1 and S2 [Murmurs] : no murmurs [Heart Sounds Pericardial Friction Rub] : no pericardial rub [Breast Appearance] : normal in appearance [Breast Palpation Mass] : no palpable masses [Abdomen Soft] : soft [Bowel Sounds] : normal bowel sounds [] : no hepato-splenomegaly [Abdomen Tenderness] : non-tender [Abdomen Mass (___ Cm)] : no abdominal mass palpated [FreeTextEntry1] : t

## 2019-03-21 LAB
ALBUMIN SERPL ELPH-MCNC: 4.6 G/DL
ALP BLD-CCNC: 55 U/L
ALT SERPL-CCNC: 32 U/L
ANION GAP SERPL CALC-SCNC: 16 MMOL/L
APPEARANCE: CLEAR
AST SERPL-CCNC: 38 U/L
BACTERIA: NEGATIVE
BASOPHILS # BLD AUTO: 0.03 K/UL
BASOPHILS NFR BLD AUTO: 0.9 %
BILIRUB SERPL-MCNC: 0.4 MG/DL
BILIRUBIN URINE: NEGATIVE
BLOOD URINE: NEGATIVE
BUN SERPL-MCNC: 11 MG/DL
CALCIUM SERPL-MCNC: 8.9 MG/DL
CHLORIDE SERPL-SCNC: 104 MMOL/L
CHOLEST SERPL-MCNC: 219 MG/DL
CHOLEST/HDLC SERPL: 3.5 RATIO
CO2 SERPL-SCNC: 22 MMOL/L
COLOR: NORMAL
CREAT SERPL-MCNC: 1.11 MG/DL
EOSINOPHIL # BLD AUTO: 0.11 K/UL
EOSINOPHIL NFR BLD AUTO: 3.3 %
GLUCOSE QUALITATIVE U: NEGATIVE
GLUCOSE SERPL-MCNC: 103 MG/DL
HCT VFR BLD CALC: 38 %
HDLC SERPL-MCNC: 63 MG/DL
HGB BLD-MCNC: 12.8 G/DL
HYALINE CASTS: 0 /LPF
IMM GRANULOCYTES NFR BLD AUTO: 0.3 %
KETONES URINE: NEGATIVE
LDLC SERPL CALC-MCNC: 131 MG/DL
LEUKOCYTE ESTERASE URINE: NEGATIVE
LYMPHOCYTES # BLD AUTO: 1.34 K/UL
LYMPHOCYTES NFR BLD AUTO: 39.9 %
MAN DIFF?: NORMAL
MCHC RBC-ENTMCNC: 28.2 PG
MCHC RBC-ENTMCNC: 33.7 GM/DL
MCV RBC AUTO: 83.7 FL
MICROSCOPIC-UA: NORMAL
MONOCYTES # BLD AUTO: 0.28 K/UL
MONOCYTES NFR BLD AUTO: 8.3 %
NEUTROPHILS # BLD AUTO: 1.59 K/UL
NEUTROPHILS NFR BLD AUTO: 47.3 %
NITRITE URINE: NEGATIVE
PH URINE: 6
PLATELET # BLD AUTO: 166 K/UL
POTASSIUM SERPL-SCNC: 4.2 MMOL/L
PROT SERPL-MCNC: 7.3 G/DL
PROTEIN URINE: NEGATIVE
RBC # BLD: 4.54 M/UL
RBC # FLD: 13.8 %
RED BLOOD CELLS URINE: 3 /HPF
SODIUM SERPL-SCNC: 142 MMOL/L
SPECIFIC GRAVITY URINE: 1.01
SQUAMOUS EPITHELIAL CELLS: 0 /HPF
TRIGL SERPL-MCNC: 123 MG/DL
UROBILINOGEN URINE: NORMAL
WBC # FLD AUTO: 3.36 K/UL
WHITE BLOOD CELLS URINE: 0 /HPF

## 2020-03-31 ENCOUNTER — APPOINTMENT (OUTPATIENT)
Dept: OTHER | Facility: CLINIC | Age: 70
End: 2020-03-31
Payer: COMMERCIAL

## 2020-03-31 PROCEDURE — 99442: CPT

## 2020-03-31 PROCEDURE — 99396 PREV VISIT EST AGE 40-64: CPT

## 2020-03-31 RX ORDER — LANSOPRAZOLE, AMOXICILLIN, CLARITHROMYCIN 30-500-500
KIT ORAL
Qty: 1 | Refills: 0 | Status: COMPLETED | COMMUNITY
Start: 2019-01-22 | End: 2020-03-31

## 2020-04-01 NOTE — REVIEW OF SYSTEMS
[Fever] : no fever [Chills] : no chills [Earache] : no earache [Loss Of Hearing] : no hearing loss [Heart Rate Is Slow] : the heart rate was not slow [Heart Rate Is Fast] : the heart rate was not fast [Wheezing] : no wheezing

## 2020-04-01 NOTE — HISTORY OF PRESENT ILLNESS
[Home] : at home, [unfilled] , at the time of the visit. [Other Location: e.g. Home (Enter Location, City,State)___] : at [unfilled] [Patient] : the patient [Self] : self [FreeTextEntry1] : this encounter was conducted over  on the telephone during time of COVID 19 pandemic\par Patient is a retired from [employer] 6/10/ 2006 Patient was diagnosed with Prostate Adenocarcinoma T1cN0, Lupis score \par He subsequently underwent IM Radiation therapy 04 16 2007- 06 19 2007 and seed implant \par c/o inability to sustain erections but stated that Viagra 100 mg for ED is effective \par he is c/o intermittent heartburn 3 times a week and L mid abd pain daily \par  mostly before BM, \par He has no constipation , no blood in stool\par  stating that abd Sx are unchanged \par  his CT abd and pelvis 01 30 2019  NL \par dysphagia for solids on and off - EGD \par he stated he saw Dr Osborne for Urology follow and  had a PSA 12/2019\par EGD 01 019- HP pos gastritis , was prescribed course of ABX \par he stated that he had colonoscopy 5 years ago \par he did not  bring me report \par \par patient was born in [facility] native \par Has 2 sons\par \par wife had Breast cancer in 2012 but doing well \par \par

## 2020-04-01 NOTE — DISCUSSION/SUMMARY
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 \par Dates: 09 11 2001 3 days , 12 hours on the first day and 8 hours for 2 days \par Location: adjacent to [facility]/pile, arrived after [facility] collapse,\par Activity: generator maintenance \par Occ Hx: working at [employer] \par \par HPI: \par Upper resp: occasional nasal congestion \par Lower reps:no cough, no wheezing, no SOB \par Cardiac:no chest pain, no palpitations, no heart murmur, no MCKOY, no leg swelling, occasional skipped beat \par GI:  heartburn few times a week, for few years acid reflex, abdominal pain for 6 years in epig area, come and goes, worse before BM, and its better after BM , had abd SONO and CT , EGD, treated for HP with no improvement , no nausea,no vomiting, no unexplained weight loss, no blood in stool, no diarrhea, no constipation \par Sleep apnea: no \par \par Skin: no lesions /no rash \par \par \par PCP: none\par Urologist Dr Jose Damon\par \par \par PMH/PSH: Prostate CA Prostate Seeds and RT \par Colonoscopy early 2015 \par 	 \par \par \par \par Review of Systems—IAMQ reviewed with patient\par \par PE: to be performed at a later date during face to face encounter to complete WTC monitoring visit\par \par \par Spirometry: to be performed at a later date during face to face encounter to complete WTC monitoring visit\par \par \par \par A/P: periodic WTC monitoring visit \par will defer GI follow up at this time due to pandemic \par  might need colonoscopy

## 2020-04-01 NOTE — DISCUSSION/SUMMARY
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 \par Dates: 09 11 2001 3 days , 12 hours on the first day and 8 hours for 2 days \par Location: adjacent to [facility]/pile, arrived after [facility] collapse,\par Activity: generator maintenance \par Occ Hx: working at [employer] \par \par HPI: \par Upper resp: occasional nasal congestion \par Lower reps:no cough, no wheezing, no SOB \par Cardiac:no chest pain, no palpitations, no heart murmur, no MCKOY, no leg swelling, occasional skipped beat \par GI:  heartburn few times a week, for few years acid reflex, abdominal pain for 6 years in epig area, come and goes, worse before BM, and its better after BM , had abd SONO and CT , EGD, treated for HP with no improvement , no nausea,no vomiting, no unexplained weight loss, no blood in stool, no diarrhea, no constipation \par Sleep apnea: no \par \par Skin: no lesions /no rash \par \par \par PCP: none\par Urologist Dr Jose Damon\par \par \par PMH/PSH: Prostate CA Prostate Seeds and RT \par Colonoscopy early 2015 \par 	 \par \par \par \par Review of Systems—IAMQ reviewed with patient\par \par PE: to be performed at a later date during face to face encounter to complete WTC monitoring visit\par \par \par Spirometry: to be performed at a later date during face to face encounter to complete WTC monitoring visit\par \par \par \par A/P: periodic WTC monitoring visit \par will defer GI follow up at this time due to pandemic \par  might need colonoscopy

## 2020-04-01 NOTE — HISTORY OF PRESENT ILLNESS
[Home] : at home, [unfilled] , at the time of the visit. [Other Location: e.g. Home (Enter Location, City,State)___] : at [unfilled] [Patient] : the patient [Self] : self [FreeTextEntry1] : this encounter was conducted over  on the telephone during time of COVID 19 pandemic\par Patient is a retired from [employer] 6/10/ 2006 Patient was diagnosed with Prostate Adenocarcinoma T1cN0, Lupis score \par He subsequently underwent IM Radiation therapy 04 16 2007- 06 19 2007 and seed implant \par c/o inability to sustain erections but stated that Viagra 100 mg for ED is effective \par he is c/o intermittent heartburn 3 times a week and L mid abd pain daily \par  mostly before BM, \par He has no constipation , no blood in stool\par  stating that abd Sx are unchanged \par  his CT abd and pelvis 01 30 2019  NL \par dysphagia for solids on and off - EGD \par he stated he saw Dr Osborne for Urology follow and  had a PSA 12/2019\par EGD 01 019- HP pos gastritis , was prescribed course of ABX \par he stated that he had colonoscopy 5 years ago \par he did not  bring me report \par \par patient was born in [facility] native \par Has 2 sons\par \par wife had Breast cancer in 2012 but doing well \par \par

## 2020-04-01 NOTE — ASSESSMENT
[FreeTextEntry1] : I had prescribed treatment for HP gastritis LAST YEAR BUT PT STATED THAT  BUT THAT HIS HEARBURN AND ABD PAIN IS NOT BETTER\par  HE IS NOT TAKING ANY MEDS FOR IT \par \par  he DID NOT forward me his latest colonoscopy report \par WILL RE ASSES THIS DURING NEXT FACE TO FACE ENCOUNTER \par MIGHT NEED Colonoscopy \par AND hp BREATH TEST \par ct  ABD WAS nl \par  I ADVSIED HIM TO TAKE ppi  OTC AS NEEDED SEE IF THERE WILL BE ANY IMPROVEMENT  \par  hx of CAP- condition is certified BY [facility] as WTC related \par \par  ED - cont with Viagra PRN \par . \par

## 2020-04-01 NOTE — ASSESSMENT
[FreeTextEntry1] : I had prescribed treatment for HP gastritis LAST YEAR BUT PT STATED THAT  BUT THAT HIS HEARBURN AND ABD PAIN IS NOT BETTER\par  HE IS NOT TAKING ANY MEDS FOR IT \par \par  he DID NOT forward me his latest colonoscopy report \par WILL RE ASSES THIS DURING NEXT FACE TO FACE ENCOUNTER \par MIGHT NEED Colonoscopy \par AND hp BREATH TEST \par ct  ABD WAS nl \par  I ADVSIED HIM TO TAKE ppi  OTC AS NEEDED SEE IF THERE WILL BE ANY IMPROVEMENT  \par  hx of CAP- condition is certified BY [facility] as WTC related \par \par  ED - cont with Viagra PRN \par . \par

## 2020-04-01 NOTE — PHYSICAL EXAM
[Oriented To Time, Place, And Person] : oriented to person, place, and time [Impaired Insight] : insight and judgment were intact

## 2020-04-06 ENCOUNTER — EMERGENCY (EMERGENCY)
Facility: HOSPITAL | Age: 70
LOS: 1 days | Discharge: HOME | End: 2020-04-06
Attending: ORTHOPAEDIC SURGERY
Payer: MEDICARE

## 2020-04-06 VITALS
HEIGHT: 72 IN | OXYGEN SATURATION: 98 % | DIASTOLIC BLOOD PRESSURE: 73 MMHG | RESPIRATION RATE: 20 BRPM | WEIGHT: 169.98 LBS | TEMPERATURE: 103 F | HEART RATE: 115 BPM | SYSTOLIC BLOOD PRESSURE: 134 MMHG

## 2020-04-06 VITALS
SYSTOLIC BLOOD PRESSURE: 113 MMHG | DIASTOLIC BLOOD PRESSURE: 76 MMHG | HEART RATE: 102 BPM | TEMPERATURE: 103 F | OXYGEN SATURATION: 99 % | RESPIRATION RATE: 19 BRPM

## 2020-04-06 PROCEDURE — 99282 EMERGENCY DEPT VISIT SF MDM: CPT

## 2020-04-06 PROCEDURE — 99283 EMERGENCY DEPT VISIT LOW MDM: CPT | Mod: CS

## 2020-04-06 RX ORDER — ACETAMINOPHEN 500 MG
325 TABLET ORAL ONCE
Refills: 0 | Status: COMPLETED | OUTPATIENT
Start: 2020-04-06 | End: 2020-04-06

## 2020-04-06 RX ORDER — ACETAMINOPHEN 500 MG
975 TABLET ORAL ONCE
Refills: 0 | Status: COMPLETED | OUTPATIENT
Start: 2020-04-06 | End: 2020-04-06

## 2020-04-06 RX ADMIN — Medication 975 MILLIGRAM(S): at 19:08

## 2020-04-06 NOTE — ED PROVIDER NOTE - PATIENT PORTAL LINK FT
You can access the FollowMyHealth Patient Portal offered by [facility] by registering at the following website: [email or web address]. By joining [employer]’s FollowMyHealth portal, you will also be able to view your health information using other applications (apps) compatible with our system.

## 2020-04-06 NOTE — ED PROVIDER NOTE - PMH
Prostate cancer  dx 6 yrs ago txted with radiation therapy   Hx SEEDS implant 2016  Redundant prepuce and phimosis

## 2020-04-06 NOTE — ED PROVIDER NOTE - OBJECTIVE STATEMENT
69 year old male with hx of Prostate cancer 2016 had SEEDS implant stable on remission. Patient came in to ER today due to FEVER 101 + x 3 days with weakness, myalgia and loss of appetite. headache , chills and night sweats, dry cough. Denies CP , NO N/V 69 year old male with hx of Prostate cancer 2016 had SEEDS implant stable on remission. Patient came in to ER today due to FEVER 101 + x 3 days with weakness, myalgia and loss of appetite. headache , chills and night sweats, dry cough. Denies CP , NO N/V , Patient s taking tylenol at home , Not taking regular medications.

## 2020-04-06 NOTE — ED ADULT NURSE NOTE - OBJECTIVE STATEMENT
68 y/o male with PMH signidficant for prostate cancer 70 y/o male with PMH significant for prostate cancer s/p seeds 2016 presenting to the ED complaining of weakness, myalgia, fevers, chills, cough and anosmia x 2 days. Tmax 101. Patient denies n/v/d, numbness, tingling, SOB, chest pain. Patient speaking in full sentences. Taking Tylenol with relief. Patient febrile and tachycardic in ED. A&OX3. Safety and comfort measures provided.

## 2020-04-06 NOTE — ED PROVIDER NOTE - NSFOLLOWUPINSTRUCTIONS_ED_ALL_ED_FT
YOU WERE SEEN IN THE ED FOR A LIKELY VIRAL ILLNESS. WE CANNOT PERFORM DEFINITIVE TESTING AT THIS TIME FOR THE NOVEL CORONAVIRUS. PLEASE READ THE INFORMATION PACKET PROVIDED TO YOU CAREFULLY.     YOU SHOULD SELF-QUARANTINE FOR 14 DAYS TO AVOID POTENTIAL SPREAD OF THE CORONAVIRUS.       YOU WILL BE CONTACTED BY [facility] OR YOUR LOCAL HEALTH DEPARTMENT AFTER YOUR DISCHARGE TO HELP GUIDE YOU IN YOUR QUARANTINE AND ADVISE YOU FURTHER.    Return to the ED for difficulty breathing.    You may over the counter acetaminophen (Tylenol) 650mg every 6 hours as needed for fever or pain. There is some concern in the medical community about using ibuprofen (Advil, Motrin) and other NSAIDs in people with COVID infections and until there is more research on this subject it may be best to avoid NSAIDs like ibuprofen at the moment unless you have an allergy to acetaminophen (Tylenol).  Do NOT exceed 3500mg acetaminophen in 24 hours.  Please do not take these medications if you do not have pain or fever or if you have any history of liver disease.    -------------    What is a coronavirus?  Coronaviruses are a large family of viruses that cause illnesses ranging from the common cold to more severe diseases such as Middle East Respiratory Syndrome (MERS) and Severe Acute Respiratory Syndrome (SARS).    What is Novel Coronavirus (COVID-19)?  The Centers for Disease Control and Prevention (CDC) is closely monitoring the outbreak caused by COVID-19. For the latest information about COVID-19, visit the CDC website at CDC.gov/Coronavirus    How are coronaviruses spread?  Coronaviruses can be transmitted from person to person, usually after close contact with an infected  person (for example, in a household, workplace, or healthcare setting), via droplets that become airborne after a cough or sneeze. These droplets can then infect a nearby person. Transmission can also occur by touching recently contaminated surfaces.    Is there a treatment for a COVID-19?  There is no specific treatment for disease caused by COVID-19. However, many of the symptoms can be treated based on the patient’s clinical condition. Supportive care for infected persons can be highly effective.    What are the symptoms of coronavirus infection?  It depends on the virus, but common signs include fever and/or respiratory symptoms such as cough and shortness of breath. In more severe cases, infection can cause pneumonia, severe acute respiratory syndrome, kidney failure and even death. Fortunately, most cases of COVID-19 have an illness no different than the influenza (flu), with a majority of these patients having mild symptoms and overall mortality which appears to be not much different than the flu.    What can I do to protect myself?  The best precautionary measures:  – washing your hands  – covering your cough  – disinfecting surfaces  – it is also advisable to avoid close contact with anyone showing symptoms of respiratory illness such as coughing and sneezing  – those with symptoms should wear a surgical mask when around others    What can I do to protect those around me?  If you have been identified as someone who may be infected with COVID-19, we recommend you follow the self-isolation procedures outlined on the following page to protect those around you and to limit the spread of this virus.    We recommend the below precautionary steps from now until 14 days from when you returned from your travel or date of your last known possible contact:    — Do not go to work, school or public areas. Avoid using public transportation, ridesharing or taxis.  — As much as possible, separate yourself from other people in your home. If you can, you should stay in a room and away from other people. Also, you should use a separate bathroom if available.  — Wear the supplied mask whenever you are around other people.  — If you have a non-urgent medical appointment, please reschedule for a later date. If the appointment is urgent, please call the health care provider and tell them that you are on self-isolation for possible COVID-19. This will help the health care provider’s office take steps to keep other people from getting infected or exposed. If you can reschedule routine appointments, do so.  — Wash your hands often with soap and water for at least 15 to 20 seconds or clean your hands with an alcohol-based hand  that contains 60 to 95% alcohol, covering all surfaces of your hands and rubbing them together until they feel dry. Soap and water should be used preferentially if hands are visibly dirty.  — Cover your mouth and nose with a tissue when you cough or sneeze. Throw used tissues in a lined trash can. Immediately wash your hands.  — Avoid touching your eyes, nose, and mouth with your hands.  — Avoid sharing personal household items. You should not share dishes, drinking glasses, cups, eating utensils, towels, or bedding with other people or pets in your home. After using these items, they should be washed thoroughly with soap and water.  — Clean and disinfect all “high-touch” surfaces every day. High touch surfaces include counters, tabletops, doorknobs, light switches, remote controls, bathroom fixtures, toilets, phones, keyboards, tablets, and bedside tables. Also, clean any surfaces that may have blood, stool, or body fluids on them.

## 2020-04-19 ENCOUNTER — FORM ENCOUNTER (OUTPATIENT)
Age: 70
End: 2020-04-19

## 2021-04-13 ENCOUNTER — APPOINTMENT (OUTPATIENT)
Dept: OTHER | Facility: CLINIC | Age: 71
End: 2021-04-13
Payer: COMMERCIAL

## 2021-04-13 PROCEDURE — 99442: CPT | Mod: 95

## 2021-04-13 PROCEDURE — 99397 PER PM REEVAL EST PAT 65+ YR: CPT | Mod: 95

## 2021-04-13 NOTE — DISCUSSION/SUMMARY
[Patient seen for WTC Monitoring ___] : Patient was seen for WTC monitoring [unfilled] [Please See Note in Chart and Documentation in Trial DB] : Please see note in chart and documentation in Trial DB. 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 \par Dates: 09 11 2001 3 days , 12 hours on the first day and 8 hours for 2 days \par Location: [facility], arrived after [facility] collapse,\par Activity: generator maintenance \par Occ Hx: working at [employer] \par \par HPI: \par Upper resp: occasional nasal congestion \par Lower reps:no cough, no wheezing, no SOB \par Cardiac:no chest pain, no palpitations, no heart murmur, no MCKOY, no leg swelling, occasional skipped beat \par GI: heartburn few times a week, for few years acid reflex, abdominal pain for 7 years in epig area, come and goes, worse before BM, and its better after BM , had abd SONO and CT , EGD, treated for HP with no improvement , no nausea,no vomiting, no unexplained weight loss, no blood in stool, no diarrhea, no constipation \par Sleep apnea: no \par \par Skin: no lesions /no rash \par \par \par PCP: none\par Urologist Dr Jose Damon- stated that had a follow up 9/2020 \par \par \par PMH/PSH: Prostate CA Prostate Seeds and RT \par  	 \par \par \par \par Review of Systems—IAMQ reviewed with patient\par \par \par \par \par \par \par \par A/P: periodic C monitoring visit \par see follow up Occ MEd note \par  \par

## 2021-04-13 NOTE — HISTORY OF PRESENT ILLNESS
[Verbal consent obtained from patient] : the patient, [unfilled] [Home] : at home, [unfilled] , at the time of the visit. [Other Location: e.g. Home (Enter Location, City,State)___] : at [unfilled] [Patient] : the patient [Self] : self [FreeTextEntry1] : \par Patient is a retired from [employer] 6/10/ 2006 Patient was diagnosed with Prostate Adenocarcinoma T1cN0, Lupis score \par He subsequently underwent IM Radiation therapy 04 16 2007- 06 19 2007 and seed implant \par c/o inability to sustain erections but stated that Viagra 100 mg for ED is effective \par he is c/o intermittent heartburn 3 times a week and L mid abd pain daily \par  mostly before BM, he stated that had had NL colonoscopy 1/2021 \par He has no constipation , no blood in stool\par  stating that abd Sx are unchanged \par  his CT abd and pelvis 01 30 2019  NL \par had EGD 2019\par \par EGD 01 019- HP pos gastritis , was prescribed course of ABX \par hr did not have a follow up afterwards \par \par patient was born in [facility] native \par Has 2 sons\par \par wife had Breast cancer in 2012 but doing well \par \par

## 2021-04-13 NOTE — HEALTH RISK ASSESSMENT
[Patient reported colonoscopy was normal] : Patient reported colonoscopy was normal [ColonoscopyDate] : 2/2021

## 2021-04-13 NOTE — DISCUSSION/SUMMARY
[Patient seen for WTC Monitoring ___] : Patient was seen for WTC monitoring [unfilled] [Please See Note in Chart and Documentation in Trial DB] : Please see note in chart and documentation in Trial DB. 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 \par Dates: 09 11 2001 3 days , 12 hours on the first day and 8 hours for 2 days \par Location: adjacent [facility], arrived after [facility] collapse,\par Activity: generator maintenance \par Occ Hx: working at [employer] \par \par HPI: \par Upper resp: occasional nasal congestion \par Lower reps:no cough, no wheezing, no SOB \par Cardiac:no chest pain, no palpitations, no heart murmur, no MCKOY, no leg swelling, occasional skipped beat \par GI: heartburn few times a week, for few years acid reflex, abdominal pain for 7 years in epig area, come and goes, worse before BM, and its better after BM , had abd SONO and CT , EGD, treated for HP with no improvement , no nausea,no vomiting, no unexplained weight loss, no blood in stool, no diarrhea, no constipation \par Sleep apnea: no \par \par Skin: no lesions /no rash \par \par \par PCP: none\par Urologist Dr Jose Damon- stated that had a follow up 9/2020 \par \par \par PMH/PSH: Prostate CA Prostate Seeds and RT \par  	 \par \par \par \par Review of Systems—IAMQ reviewed with patient\par \par \par \par \par \par \par \par A/P: periodic C monitoring visit \par see follow up Occ MEd note \par  \par

## 2021-04-13 NOTE — HISTORY OF PRESENT ILLNESS
[Verbal consent obtained from patient] : the patient, [unfilled] [Home] : at home, [unfilled] , at the time of the visit. [Other Location: e.g. Home (Enter Location, City,State)___] : at [unfilled] [Patient] : the patient [Self] : self [FreeTextEntry1] : \par Patient is a retired from [employer] 6/10/ 2006 Patient was diagnosed with Prostate Adenocarcinoma T1cN0, Lupis score \par He subsequently underwent IM Radiation therapy 04 16 2007- 06 19 2007 and seed implant \par c/o inability to sustain erections but stated that Viagra 100 mg for ED is effective \par he is c/o intermittent heartburn 3 times a week and L mid abd pain daily \par  mostly before BM, he stated that had had NL colonoscopy 1/2021 \par He has no constipation , no blood in stool\par  stating that abd Sx are unchanged \par  his CT abd and pelvis 01 30 2019  NL \par had EGD 2019\par \par EGD 01 019- HP pos gastritis , was prescribed course of ABX \par hr did not have a follow up afterwards \par \par patient was born in [facility] native \par Has 2 sons\par \par wife had Breast cancer in 2012 but doing well \par \par

## 2021-04-13 NOTE — REVIEW OF SYSTEMS
[see HPI] : see HPI [Fever] : no fever [Chills] : no chills [Earache] : no earache [Loss Of Hearing] : no hearing loss [Heart Rate Is Slow] : the heart rate was not slow [Heart Rate Is Fast] : the heart rate was not fast [Wheezing] : no wheezing

## 2021-04-13 NOTE — ASSESSMENT
[FreeTextEntry1] : I had prescribed treatment for HP gastritis LAST YEAR BUT PT STATED THAT  BUT THAT HAS  Heartburn \par  HE IS NOT TAKING ANY MEDS FOR IT \par \par  he DID NOT forward me his latest colonoscopy report BUT STATED THAT HAD ONE 1.2021 \par \par I RECOMMENDED TO FOLLOW UP With HIS GI Specialist WHO DID COLONOSCOPY FOR HEARTBURN TREATMENT \par AND hp BREATH TEST \par ct  ABD WAS nl \par  I ADVSIED HIM TO TAKE OMEPRAZOLE  OTC AS NEEDED BEFORE HE CAN SEE GI \par  hx of CAP- condition is certified BY [facility] as WTC related \par STTED PSA WAS CHECKED BY IS UROLOGIST \par  ED - cont with Viagra PRN \par . \par

## 2021-04-13 NOTE — ASSESSMENT
[FreeTextEntry1] : I had prescribed treatment for HP gastritis LAST YEAR BUT PT STATED THAT  BUT THAT HAS  Heartburn \par  HE IS NOT TAKING ANY MEDS FOR IT \par \par  he DID NOT forward me his latest colonoscopy report BUT STATED THAT HAD ONE 1.2021 \par \par I RECOMMENDED TO FOLLOW UP With HIS GI Specialist WHO DID COLONOSCOPY FOR HEARTBURN TREATMENT \par AND hp BREATH TEST \par ct  ABD WAS nl \par  I ADVSIED HIM TO TAKE OMEPRAZOLE  OTC AS NEEDED BEFORE HE CAN SEE GI \par  hx of CAP- condition is certified BY [facility] as WTC related \par STTED PSA WAS CHECKED BY IS UROLOGIST \par  ED - cont with Viagra PRN \par . \par

## 2021-07-21 NOTE — PHYSICAL EXAM
[Oriented To Time, Place, And Person] : oriented to person, place, and time [Impaired Insight] : insight and judgment were intact Infliximab Counseling:  I discussed with the patient the risks of infliximab including but not limited to myelosuppression, immunosuppression, autoimmune hepatitis, demyelinating diseases, lymphoma, and serious infections.  The patient understands that monitoring is required including a PPD at baseline and must alert us or the primary physician if symptoms of infection or other concerning signs are noted.

## 2021-10-25 NOTE — ED PROVIDER NOTE - MUSCULOSKELETAL, MLM
If you are a smoker, it is important for your health to stop smoking. Please be aware that second hand smoke is also harmful. Spine appears normal, range of motion is not limited, no muscle or joint tenderness

## 2022-11-08 NOTE — DISCHARGE NOTE ADULT - FUNCTIONAL SCREEN CURRENT LEVEL: AMBULATION, MLM
[place] [facility] - [facility]  Address: [address] [facility], [address] [facility], [place], [address] [place] [address]  Phone: [phone]    [facility]  Address: [address], [place], [address] [place] [address]  Phone: [phone] [address] [address] [facility]  Address: [address] [place] [address], [place], [address] [facility]  Phone: [phone]
(0) independent

## 2023-01-01 NOTE — H&P ADULT. - ATTENDING COMMENTS
65 y/o male HX of Prostate CA, S/P RTX, + Fever, + Sepsis, + Fatigue, x 5 days, no cough, no CP, NO SOB, no recent travel, no sick contact, pt was started on Tamiflu 4 days ago for possible Flu?, + Myalgia, Chest X ray prelim Left lower lobe Pneumonia, CPK 1031, + CHRISTAL as per lab, Hyperglycemia, RVP Neg., BUN 11, Creatinine 1.50, Glucose 198, AST 55     CT Chest No Contrast, IV Ceftriaxone, IV Zithromax, IVF NS 2 100 cc/hr x 24 hours, DVT Prophylaxis: SQ Heparin, F/U CBC, CMP, Cultures, HgbA1c, Hep A,B,C  profile, PT, PTT, INR, CPK, NILSA , ECG    Case D/W Pt, HS  Pt was seen & Examined by me, Dr. TAMARA Otto on 3/5/2017.
No

## 2023-03-02 ENCOUNTER — APPOINTMENT (OUTPATIENT)
Dept: OTHER | Facility: CLINIC | Age: 73
End: 2023-03-02
Payer: COMMERCIAL

## 2023-03-02 PROCEDURE — 99212 OFFICE O/P EST SF 10 MIN: CPT | Mod: 25

## 2023-03-02 PROCEDURE — 94010 BREATHING CAPACITY TEST: CPT

## 2023-03-02 PROCEDURE — 99397 PER PM REEVAL EST PAT 65+ YR: CPT | Mod: 25

## 2023-03-02 NOTE — HISTORY OF PRESENT ILLNESS
[FreeTextEntry1] : \par Patient is a retired from [employer] 6/10/ 2006 Patient was diagnosed with Prostate Adenocarcinoma T1cN0, [place] score \par He subsequently underwent IM Radiation therapy 04 16 2007- 06 19 2007 and seed implant \par c/o inability to sustain erections but stated that Viagra 100 mg for ED is effective \par \par he stated he saw Dr Osborne for Urology follow and  had a PSA 12/2022\par EGD 01 019- HP pos gastritis , was prescribed course of ABX \par he stated that he had colonoscopy  2 years ago \par he did not  bring me report \par \par patient was born in [facility] native \par Has 2 sons\par \par wife had Breast cancer in 2012 but doing well \par \par

## 2023-03-02 NOTE — PHYSICAL EXAM
[General Appearance - Alert] : alert [General Appearance - In No Acute Distress] : in no acute distress [Auscultation Breath Sounds / Voice Sounds] : lungs were clear to auscultation bilaterally [Heart Rate And Rhythm] : heart rate was normal and rhythm regular [Heart Sounds] : normal S1 and S2 [Heart Sounds Gallop] : no gallops [Murmurs] : no murmurs [Heart Sounds Pericardial Friction Rub] : no pericardial rub [Bowel Sounds] : normal bowel sounds [Abdomen Soft] : soft [Abdomen Tenderness] : non-tender [] : no hepato-splenomegaly [Abdomen Mass (___ Cm)] : no abdominal mass palpated [Oriented To Time, Place, And Person] : oriented to person, place, and time [Impaired Insight] : insight and judgment were intact

## 2023-03-02 NOTE — DISCUSSION/SUMMARY
[Patient seen for WTC Monitoring ___] : Patient was seen for WTC monitoring [unfilled] [Please See Note in Chart and Documentation in Trial DB] : Please see note in chart and documentation in Trial DB. 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 11 2001 \par Dates: 09 11 2001 3 days , 12 hours on the first day and 8 hours for 2 days \par Location: adjacent to [facility] [place], arrived after WTC collapse,\par Activity: generator maintenance \par Occ Hx: working at [employer] \par \par HPI: \par Upper resp: occasional nasal congestion \par Lower reps:no cough, no wheezing, no SOB \par Cardiac:no chest pain, no palpitations, no heart murmur, no MCKOY, no leg swelling, occasional skipped beat \par GI: heartburn few times a week, for few years acid reflex, abdominal pain for 8-9 years in epig area, come and goes, worse before BM, and its better after BM , had abd SONO and CT , EGD 2019, treated for HP with no improvement , no nausea,no vomiting, no unexplained weight loss, no blood in stool, no diarrhea, no constipation \par omeprazole helps \par Sleep apnea: no \par \par Skin: no lesions /no rash \par \par \par PCP: none\par Urologist Dr Jose Damon- stated that had follow up recently and PSA was not detectable \par \par \par PMH/PSH: Prostate CA Prostate Seeds and RT \par  	 \par \par \par \par Review of Systems—IAMQ reviewed with patient\par \par PE: in trial DB \par \par CXR: declined \par  thinks that recently had one ordered by PCP \par \par spirometry NL \par \par A/P: periodic WTC monitoring visit \par see follow up Occ MEd note \par  very high BP \par pt stated that he is on BP and diabetes meds but unable to recall names \par  i recommended to see PCP ASAP \par  he stated that he is seeing his Cards next week \par \par  \par

## 2023-03-02 NOTE — PAST MEDICAL HISTORY
[FreeTextEntry1] : patient is a  that was exposed to [facility] dust and debris after 09 11 2001\par \par

## 2023-03-02 NOTE — ASSESSMENT
[FreeTextEntry1] : \par  hx of CAP- condition is certified BY [facility] as WTC related \par pt regiarly sees his urologist who reportedly checks PSA \par  ED - cont with Viagra PRN \par \par Rx given \par . \par

## 2023-03-02 NOTE — PAST MEDICAL HISTORY
[FreeTextEntry1] : patient is a  that was exposed to [facility] dust and debris after 09 11 2001\par \par

## 2023-03-02 NOTE — DISCUSSION/SUMMARY
[Patient seen for WTC Monitoring ___] : Patient was seen for WTC monitoring [unfilled] [Please See Note in Chart and Documentation in Trial DB] : Please see note in chart and documentation in Trial DB. 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 \par Dates: 09 11 2001 3 days , 12 hours on the first day and 8 hours for 2 days \par Location: adjacent to [facility], arrived after W[facility] collapse,\par Activity: generator maintenance \par Occ Hx: working at [employer] \par \par HPI: \par Upper resp: occasional nasal congestion \par Lower reps:no cough, no wheezing, no SOB \par Cardiac:no chest pain, no palpitations, no heart murmur, no MCKOY, no leg swelling, occasional skipped beat \par GI: heartburn few times a week, for few years acid reflex, abdominal pain for 8-9 years in epig area, come and goes, worse before BM, and its better after BM , had abd SONO and CT , EGD 2019, treated for HP with no improvement , no nausea,no vomiting, no unexplained weight loss, no blood in stool, no diarrhea, no constipation \par omeprazole helps \par Sleep apnea: no \par \par Skin: no lesions /no rash \par \par \par PCP: none\par Urologist Dr Jose Damon- stated that had follow up recently and PSA was not detectable \par \par \par PMH/PSH: Prostate CA Prostate Seeds and RT \par  	 \par \par \par \par Review of Systems—IAMQ reviewed with patient\par \par PE: in trial DB \par \par CXR: declined \par  thinks that recently had one ordered by PCP \par \par spirometry NL \par \par A/P: periodic WTC monitoring visit \par see follow up Occ MEd note \par  very high BP \par pt stated that he is on BP and diabetes meds but unable to recall names \par  i recommended to see PCP ASAP \par  he stated that he is seeing his Cards next week \par \par  \par

## 2023-03-02 NOTE — HISTORY OF PRESENT ILLNESS
[FreeTextEntry1] : \par Patient is a retired from [employer] 6/10/ 2006 Patient was diagnosed with Prostate Adenocarcinoma T1cN0, Pierson score \par He subsequently underwent IM Radiation therapy 04 16 2007- 06 19 2007 and seed implant \par c/o inability to sustain erections but stated that Viagra 100 mg for ED is effective \par \par he stated he saw Dr Osborne for Urology follow and  had a PSA 12/2022\par EGD 01 019- HP pos gastritis , was prescribed course of ABX \par he stated that he had colonoscopy  2 years ago \par he did not  bring me report \par \par patient was born in [place] [facility] [place] \par Has 2 sons\par \par wife had Breast cancer in 2012 but doing well \par \par

## 2023-03-08 LAB
ALBUMIN SERPL ELPH-MCNC: 4.6 G/DL
ALP BLD-CCNC: 61 U/L
ALT SERPL-CCNC: 22 U/L
ANION GAP SERPL CALC-SCNC: 13 MMOL/L
APPEARANCE: CLEAR
AST SERPL-CCNC: 22 U/L
BACTERIA: NEGATIVE
BASOPHILS # BLD AUTO: 0.04 K/UL
BASOPHILS NFR BLD AUTO: 1 %
BILIRUB SERPL-MCNC: 0.6 MG/DL
BILIRUBIN URINE: NEGATIVE
BLOOD URINE: NEGATIVE
BUN SERPL-MCNC: 12 MG/DL
CALCIUM SERPL-MCNC: 9.6 MG/DL
CHLORIDE SERPL-SCNC: 104 MMOL/L
CHOLEST SERPL-MCNC: 227 MG/DL
CO2 SERPL-SCNC: 24 MMOL/L
COLOR: YELLOW
CREAT SERPL-MCNC: 1.25 MG/DL
EGFR: 61 ML/MIN/1.73M2
EOSINOPHIL # BLD AUTO: 0.13 K/UL
EOSINOPHIL NFR BLD AUTO: 3.3 %
GLUCOSE QUALITATIVE U: NEGATIVE
GLUCOSE SERPL-MCNC: 110 MG/DL
HCT VFR BLD CALC: 39.2 %
HDLC SERPL-MCNC: 61 MG/DL
HGB BLD-MCNC: 13.6 G/DL
HYALINE CASTS: 1 /LPF
IMM GRANULOCYTES NFR BLD AUTO: 0.3 %
KETONES URINE: NEGATIVE
LDLC SERPL CALC-MCNC: 142 MG/DL
LEUKOCYTE ESTERASE URINE: NEGATIVE
LYMPHOCYTES # BLD AUTO: 1.84 K/UL
LYMPHOCYTES NFR BLD AUTO: 46.9 %
MAN DIFF?: NORMAL
MCHC RBC-ENTMCNC: 28 PG
MCHC RBC-ENTMCNC: 34.7 GM/DL
MCV RBC AUTO: 80.8 FL
MICROSCOPIC-UA: NORMAL
MONOCYTES # BLD AUTO: 0.36 K/UL
MONOCYTES NFR BLD AUTO: 9.2 %
NEUTROPHILS # BLD AUTO: 1.54 K/UL
NEUTROPHILS NFR BLD AUTO: 39.3 %
NITRITE URINE: NEGATIVE
NONHDLC SERPL-MCNC: 165 MG/DL
PH URINE: 6
PLATELET # BLD AUTO: 169 K/UL
POTASSIUM SERPL-SCNC: 4.4 MMOL/L
PROT SERPL-MCNC: 6.9 G/DL
PROTEIN URINE: NORMAL
RBC # BLD: 4.85 M/UL
RBC # FLD: 14.5 %
RED BLOOD CELLS URINE: 2 /HPF
SODIUM SERPL-SCNC: 141 MMOL/L
SPECIFIC GRAVITY URINE: 1.02
SQUAMOUS EPITHELIAL CELLS: 0 /HPF
TRIGL SERPL-MCNC: 116 MG/DL
UROBILINOGEN URINE: NORMAL
WBC # FLD AUTO: 3.92 K/UL
WHITE BLOOD CELLS URINE: 1 /HPF

## 2023-06-03 NOTE — ED ADULT TRIAGE NOTE - NS ED NURSE DIRECT TO ROOM YN
Problem: Hemodialysis  Goal: Dialysis: Safe, effective, and comfortable hemodialysis treatment (Hemodialysis nurse only)  Outcome: Outcome Met, Continue evaluating goal progress toward completion  Note: Removed 2kgs of 1-2kg UF goal. Completed 2hrs treatment ordered. Spa x1 given to support BP.   Goal: Dialysis: Free of complications related to initiation/termination of dialysis (Hemodialysis nurse only)  Outcome: Outcome Met, Continue evaluating goal progress toward completion  Note: Maintained 200ml/min BFR. Good inflow & outflow. CDI CVC, capped w/ 4% sodium citrate. Report given to Gladis Neal, RN      No

## 2023-12-19 ENCOUNTER — APPOINTMENT (OUTPATIENT)
Dept: OPHTHALMOLOGY | Facility: CLINIC | Age: 73
End: 2023-12-19
Payer: MEDICARE

## 2023-12-19 ENCOUNTER — NON-APPOINTMENT (OUTPATIENT)
Age: 73
End: 2023-12-19

## 2023-12-19 PROCEDURE — 99203 OFFICE O/P NEW LOW 30 MIN: CPT

## 2024-01-16 ENCOUNTER — APPOINTMENT (OUTPATIENT)
Dept: OPHTHALMOLOGY | Facility: CLINIC | Age: 74
End: 2024-01-16

## 2024-04-17 ENCOUNTER — APPOINTMENT (OUTPATIENT)
Dept: OTHER | Facility: CLINIC | Age: 74
End: 2024-04-17
Payer: COMMERCIAL

## 2024-04-17 VITALS
SYSTOLIC BLOOD PRESSURE: 169 MMHG | BODY MASS INDEX: 26.68 KG/M2 | HEIGHT: 67 IN | RESPIRATION RATE: 16 BRPM | TEMPERATURE: 98.6 F | OXYGEN SATURATION: 100 % | WEIGHT: 170 LBS | HEART RATE: 74 BPM | DIASTOLIC BLOOD PRESSURE: 85 MMHG

## 2024-04-17 DIAGNOSIS — N52.37 ERECTILE DYSFUNCTION FOLLOWING PROSTATE ABLATIVE THERAPY: ICD-10-CM

## 2024-04-17 DIAGNOSIS — C61 MALIGNANT NEOPLASM OF PROSTATE: ICD-10-CM

## 2024-04-17 DIAGNOSIS — Z04.9 ENCOUNTER FOR EXAMINATION AND OBSERVATION FOR UNSPECIFIED REASON: ICD-10-CM

## 2024-04-17 PROCEDURE — 99397 PER PM REEVAL EST PAT 65+ YR: CPT | Mod: 25

## 2024-04-17 PROCEDURE — 99214 OFFICE O/P EST MOD 30 MIN: CPT | Mod: 25

## 2024-04-17 RX ORDER — SILDENAFIL 100 MG/1
100 TABLET, FILM COATED ORAL
Qty: 6 | Refills: 10 | Status: ACTIVE | COMMUNITY
Start: 2017-02-07 | End: 1900-01-01

## 2024-04-17 NOTE — DISCUSSION/SUMMARY
[FreeTextEntry3] : HPI   73 y M presents to [facility] for his 8th annual  health exam.  HE is covered for Prostate cancer and ED  He reports to see urologist  Dr Jose Damon- stated that had follow up recently and PSA was not detectable  PT with High BP he follows with Dr. Barger recently saw his PMD he does not recall what medication he takes for bp but admits to not taking it today  He reports to have many skin lesions on his body he was recently seen by Derm - wants to know if his cream can be covered cannot recall the name   PCP: Dr. 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  Dates: 09 11 2001 3 days , 12 hours on the first day and 8 hours for 2 days  Location: adjacent to [facility], arrived after [facility] collapse,  Activity: generator maintenance    PMH/PSH:  Prostate CA Prostate Seeds and RT Fam Hx: none  Allergies: NKDA Meds: see above  Soc Hx: lives with wife / wife had Breast cancer in 2012 but doing well wants to enroll her in program  Smoking Status: never  Preventive Screening:  Colonoscopy-2022 Labs- done recently  CXR- done recently  Flu shot declined  Lung Ca- not applicable   Review of Systems-IAMQ reviewed with patient    PE:  VS: Weight 170 lbs Height 5'7  Gen: 74 yo male NAD Cognitive assessment: Alert, oriented x 3.   Results: Imaging: cxry did with PMD  Spirometry: not done today due to high bp    A/P:  - CXR, CBC, CMP, lipids, UA done recently  -reviewed past labs and imaging w pt  -Prostate ca- cont care with onc  - ED- refilled medications sent to retail pharmacy as per pt request -discussed in detail bp management and risk of stroke and death and needs to see his PMD and have medication complainacne  - discussed in detail coverage criteria for derm medications and he does not meet that he should send the biopsy report to see if malignant -flu shot declined  -RTC 1 year or sooner if needed

## 2024-04-17 NOTE — PAST MEDICAL HISTORY
[FreeTextEntry1] : patient is a  that was exposed to [facility] dust and debris after 09 11 2001\par  \par

## 2024-04-17 NOTE — DISCUSSION/SUMMARY
[FreeTextEntry3] : HPI   73 y M presents to [facility] for his 8th annual  health exam.  HE is covered for Prostate cancer and ED  He reports to see urologist  Dr Jose Damon- stated that had follow up recently and PSA was not detectable  PT with High BP he follows with Dr. Barger recently saw his PMD he does not recall what medication he takes for bp but admits to not taking it today  He reports to have many skin lesions on his body he was recently seen by Derm - wants to know if his cream can be covered cannot recall the name   PCP: Dr. Barger   Occupation: retired   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 collapse,  Activity: generator maintenance    PMH/PSH:  Prostate CA Prostate Seeds and RT Fam Hx: none  Allergies: NKDA Meds: see above  Soc Hx: lives with wife / wife had Breast cancer in 2012 but doing well wants to enroll her in program  Smoking Status: never  Preventive Screening:  Colonoscopy-2022 Labs- done recently  CXR- done recently  Flu shot declined  Lung Ca- not applicable   Review of Systems-IAMQ reviewed with patient    PE:  VS: Weight 170 lbs Height 5'7  Gen: 74 yo male NAD Cognitive assessment: Alert, oriented x 3.   Results: Imaging: cxry did with PMD  Spirometry: not done today due to high bp    A/P:  - CXR, CBC, CMP, lipids, UA done recently  -reviewed past labs and imaging w pt  -Prostate ca- cont care with onc  - ED- refilled medications sent to retail pharmacy as per pt request -discussed in detail bp management and risk of stroke and death and needs to see his PMD and have medication complainacne  - discussed in detail coverage criteria for derm medications and he does not meet that he should send the biopsy report to see if malignant -flu shot declined  -RTC 1 year or sooner if needed

## 2024-04-17 NOTE — REVIEW OF SYSTEMS
[Fever] : no fever [Chills] : no chills [Earache] : no earache [Loss Of Hearing] : no hearing loss [Heart Rate Is Slow] : the heart rate was not slow [Heart Rate Is Fast] : the heart rate was not fast [Wheezing] : no wheezing [Joint Pain] : joint pain [Joint Swelling] : joint swelling [Joint Stiffness] : joint stiffness [Suicidal] : not suicidal [Anxiety] : no anxiety [Depression] : no depression

## 2024-04-17 NOTE — ASSESSMENT
[FreeTextEntry1] : A/P: - CXR, CBC, CMP, lipids, UA done recently -reviewed past labs and imaging w pt -Prostate ca- cont care with onc - ED- refilled medications sent to retail pharmacy as per pt request -discussed in detail bp management and risk of stroke and death and needs to see his PMD and have medication compliance  - discussed in detail coverage criteria for derm medications and he does not meet that he should send the biopsy report to see if malignant -flu shot declined -RTC 1 year or sooner if needed.

## 2024-04-17 NOTE — PHYSICAL EXAM
[General Appearance - Alert] : alert [General Appearance - In No Acute Distress] : in no acute distress [Neck Appearance] : the appearance of the neck was normal [] : no respiratory distress [Auscultation Breath Sounds / Voice Sounds] : lungs were clear to auscultation bilaterally [Heart Rate And Rhythm] : heart rate was normal and rhythm regular [Heart Sounds] : normal S1 and S2 [Heart Sounds Gallop] : no gallops [Edema] : there was no peripheral edema [Bowel Sounds] : normal bowel sounds [Abdomen Soft] : soft [Abdomen Tenderness] : non-tender [Abdomen Mass (___ Cm)] : no abdominal mass palpated [Abnormal Walk] : normal gait [FreeTextEntry1] : mulitple skin lesion on back  [Oriented To Time, Place, And Person] : oriented to person, place, and time [Impaired Insight] : insight and judgment were intact

## 2024-04-17 NOTE — HISTORY OF PRESENT ILLNESS
[FreeTextEntry1] : HPI 73 y M presents to [facility] for his 8th annual health exam.  HE is covered for Prostate cancer and ED  He reports to see urologist Dr Jose Damon- stated that had follow up recently and PSA was not detectable  PT with High BP he follows with Dr. Barger recently saw his PMD he does not recall what medication he takes for bp but admits to not taking it today  He reports to have many skin lesions on his body he was recently seen by Derm - wants to know if his cream can be covered cannot recall the name

## 2024-04-17 NOTE — END OF VISIT
Elbow dislocation with larger cornoid fracture, recommend splinting with close follow up with orthopedic surgery.    Left wrist with likely distal radius fracture visible on lateral view, continue current splint. Likely will heal with casting/bracing.    Plan:  - Today's Plan of Care:  Referral to an Orthopedic Surgeon - Upper extremity  Long arm posterior mold splint    Continue with relative rest and activity modification, Ice, Compression, and Elevation.  Can apply ice 10-15 minutes 3-4 times per day as needed. OTC medications as needed.    Work Letter Given    Follow Up: as needed    If you have any further questions for your physician or physician s care team you can call [phone] and use option 3 to leave a voice message.    [Time Spent: ___ minutes] : I have spent [unfilled] minutes of time on the encounter.

## 2024-04-17 NOTE — HISTORY OF PRESENT ILLNESS
[FreeTextEntry1] : HPI 73 y M presents to [facility] for his 8th annual health exam.  HE is covered for Prostate cancer and ED  He reports to see urologist Dr Jose Damon- stated that had follow up recently and PSA was not detectable  PT with High BP he follows with Dr. Barger recently saw his PMD he does not recall what medication he takes for bp but admits to not taking it today  He reports to have many skin lesions on his body he was recently seen by Derm - wants to know if his cream can be covered cannot recall the name

## 2024-04-17 NOTE — PAST MEDICAL HISTORY
[FreeTextEntry1] : patient is a  that was exposed to [facility] dust and debris after 09 11 2001\par  \par

## 2025-04-30 ENCOUNTER — APPOINTMENT (OUTPATIENT)
Dept: OTHER | Facility: CLINIC | Age: 75
End: 2025-04-30
Payer: COMMERCIAL

## 2025-04-30 ENCOUNTER — APPOINTMENT (OUTPATIENT)
Dept: RADIOLOGY | Facility: CLINIC | Age: 75
End: 2025-04-30
Payer: COMMERCIAL

## 2025-04-30 VITALS
SYSTOLIC BLOOD PRESSURE: 108 MMHG | DIASTOLIC BLOOD PRESSURE: 73 MMHG | HEART RATE: 75 BPM | WEIGHT: 174 LBS | BODY MASS INDEX: 27.31 KG/M2 | TEMPERATURE: 98.7 F | HEIGHT: 67 IN | OXYGEN SATURATION: 96 %

## 2025-04-30 DIAGNOSIS — Z04.9 ENCOUNTER FOR EXAMINATION AND OBSERVATION FOR UNSPECIFIED REASON: ICD-10-CM

## 2025-04-30 DIAGNOSIS — N52.37 ERECTILE DYSFUNCTION FOLLOWING PROSTATE ABLATIVE THERAPY: ICD-10-CM

## 2025-04-30 PROCEDURE — 99397 PER PM REEVAL EST PAT 65+ YR: CPT | Mod: 25

## 2025-04-30 PROCEDURE — 71046 X-RAY EXAM CHEST 2 VIEWS: CPT

## 2025-04-30 PROCEDURE — 99214 OFFICE O/P EST MOD 30 MIN: CPT | Mod: 25

## 2025-05-01 LAB
ALBUMIN SERPL ELPH-MCNC: 4.5 G/DL
ALP BLD-CCNC: 64 U/L
ALT SERPL-CCNC: 46 U/L
ANION GAP SERPL CALC-SCNC: 14 MMOL/L
APPEARANCE: CLEAR
AST SERPL-CCNC: 31 U/L
BACTERIA: NEGATIVE /HPF
BASOPHILS # BLD AUTO: 0.03 K/UL
BASOPHILS NFR BLD AUTO: 0.7 %
BILIRUB SERPL-MCNC: 0.7 MG/DL
BILIRUBIN URINE: NEGATIVE
BLOOD URINE: NEGATIVE
BUN SERPL-MCNC: 13 MG/DL
CALCIUM SERPL-MCNC: 9.6 MG/DL
CAST: NORMAL /LPF
CHLORIDE SERPL-SCNC: 107 MMOL/L
CHOLEST SERPL-MCNC: 148 MG/DL
CO2 SERPL-SCNC: 21 MMOL/L
COLOR: YELLOW
CREAT SERPL-MCNC: 1.32 MG/DL
EGFRCR SERPLBLD CKD-EPI 2021: 56 ML/MIN/1.73M2
EOSINOPHIL # BLD AUTO: 0.08 K/UL
EOSINOPHIL NFR BLD AUTO: 1.8 %
EPITHELIAL CELLS: 1 /HPF
GLUCOSE QUALITATIVE U: NEGATIVE MG/DL
GLUCOSE SERPL-MCNC: 121 MG/DL
HCT VFR BLD CALC: 35.9 %
HDLC SERPL-MCNC: 50 MG/DL
HGB BLD-MCNC: 12.1 G/DL
IMM GRANULOCYTES NFR BLD AUTO: 0.2 %
KETONES URINE: ABNORMAL MG/DL
LDLC SERPL-MCNC: 78 MG/DL
LEUKOCYTE ESTERASE URINE: ABNORMAL
LYMPHOCYTES # BLD AUTO: 1.44 K/UL
LYMPHOCYTES NFR BLD AUTO: 33.1 %
MAN DIFF?: NORMAL
MCHC RBC-ENTMCNC: 28.3 PG
MCHC RBC-ENTMCNC: 33.7 G/DL
MCV RBC AUTO: 84.1 FL
MICROSCOPIC-UA: NORMAL
MONOCYTES # BLD AUTO: 0.3 K/UL
MONOCYTES NFR BLD AUTO: 6.9 %
NEUTROPHILS # BLD AUTO: 2.49 K/UL
NEUTROPHILS NFR BLD AUTO: 57.3 %
NITRITE URINE: NEGATIVE
NONHDLC SERPL-MCNC: 98 MG/DL
PH URINE: 5.5
PLATELET # BLD AUTO: 152 K/UL
POTASSIUM SERPL-SCNC: 4.1 MMOL/L
PROT SERPL-MCNC: 7.1 G/DL
PROTEIN URINE: NEGATIVE MG/DL
RBC # BLD: 4.27 M/UL
RBC # FLD: 13.2 %
RED BLOOD CELLS URINE: NORMAL /HPF
REVIEW: NORMAL
SODIUM SERPL-SCNC: 141 MMOL/L
SPECIFIC GRAVITY URINE: 1.02
SPERM-LIKE CELLS: PRESENT
TRIGL SERPL-MCNC: 116 MG/DL
UROBILINOGEN URINE: 0.2 MG/DL
WBC # FLD AUTO: 4.35 K/UL
WHITE BLOOD CELLS URINE: 1 /HPF